# Patient Record
Sex: FEMALE | Race: WHITE | NOT HISPANIC OR LATINO | Employment: FULL TIME | URBAN - METROPOLITAN AREA
[De-identification: names, ages, dates, MRNs, and addresses within clinical notes are randomized per-mention and may not be internally consistent; named-entity substitution may affect disease eponyms.]

---

## 2019-04-03 ENCOUNTER — HOSPITAL ENCOUNTER (OUTPATIENT)
Dept: GENERAL RADIOLOGY | Facility: HOSPITAL | Age: 24
Discharge: HOME | End: 2019-04-03

## 2019-07-08 ENCOUNTER — TRANSCRIBE ORDERS (OUTPATIENT)
Dept: PHYSICAL THERAPY | Facility: CLINIC | Age: 24
End: 2019-07-08

## 2019-07-08 DIAGNOSIS — M25.512 LEFT SHOULDER PAIN, UNSPECIFIED CHRONICITY: Primary | ICD-10-CM

## 2019-07-08 DIAGNOSIS — M54.9 UPPER BACK PAIN: ICD-10-CM

## 2019-07-15 ENCOUNTER — TREATMENT (OUTPATIENT)
Dept: PHYSICAL THERAPY | Facility: CLINIC | Age: 24
End: 2019-07-15

## 2019-07-15 DIAGNOSIS — M25.512 ACUTE PAIN OF LEFT SHOULDER: Primary | ICD-10-CM

## 2019-07-15 PROCEDURE — 97161 PT EVAL LOW COMPLEX 20 MIN: CPT | Performed by: PHYSICAL THERAPIST

## 2019-07-15 PROCEDURE — 97110 THERAPEUTIC EXERCISES: CPT | Performed by: PHYSICAL THERAPIST

## 2019-07-15 NOTE — PROGRESS NOTES
Physical Therapy Initial Evaluation and Plan of Care        Subjective Evaluation    History of Present Illness  Date of onset: 7/2/2019  Mechanism of injury: Patient reports that she was moving tubs at work and had increasing pain in her left shoulder.  She describes the pain as pain in the scapula and numbness radiating down into her 5th digit on a constant basis. She states that she feels as though it originates in the back and under the scapula with some wrapping pain around the left chest wall.  Pain is aggravated with use.  Has some increase in pain with looking over her left shoulder.     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          Patient Occupation: [employer] -    Precautions and Work Restrictions: light duty - no lifting over 5# or pushing/pulling 10#Quality of life: good    Pain  Quality: throbbing, dull ache, radiating and sharp  Relieving factors: heat, medications and rest (mobic, zanaflex)  Aggravating factors: overhead activity, outstretched reach, sleeping, repetitive movement, lifting and movement    Social Support  Lives with: significant other    Hand dominance: right    Treatments  Previous treatment: medication  Patient Goals  Patient goals for therapy: return to work, return to sport/leisure activities, independence with ADLs/IADLs, decreased pain and increased motion             Objective       Static Posture     Shoulders  Elevated.    Postural Observations  Seated posture: fair  Standing posture: fair  Correction of posture: has no consistent effect        Palpation   Left   Hypertonic in the levator scapulae, middle trapezius and upper trapezius.   Tenderness of the anterior deltoid, biceps, levator scapulae, middle trapezius and upper trapezius.     Tenderness     Left Shoulder   Tenderness in the biceps tendon (proximal), lateral scapula, medial scapula and scapular spine.     Active Range of Motion   Left Shoulder   Normal active range of  motion  Flexion: with pain  Abduction: with pain  External rotation 90°: with pain    Scapular Mobility   Left Shoulder   Scapular mobility: fair  Scapular Mobility with Shoulder to 90° FF   Scapular winging: moderate    Tests     Left Shoulder   Positive painful arc.   Negative empty can.          Assessment & Plan     Assessment  Impairments: abnormal or restricted ROM, activity intolerance, lacks appropriate home exercise program and pain with function  Assessment details: Patient is a 22 y/o female who presents with increased left thoracic/scapula/shoulder pain.  She has increased tenderness to palpation along medial and superior scapular border as well as along costovertebral joints at T3-8 on left with minimal extension and left rotation. Radicular symptoms to 5th digit of left hand.   She has full AROM of left shoulder however painful at end ranges.  Mild decrease in left cervical rotation at end range.  Negative special testing for internal derangement of left shoulder.   Prognosis: good  Functional Limitations: carrying objects, lifting, uncomfortable because of pain, sitting and standing  Goals  Plan Goals: Long Term Goals (8 weeks)    Patient is able to return to work/community activities with minimal to no discomfort  Patient is able to lift 15 lbs from floor to waist  Patient is able to lift 10 lbs from waist to shoulder  Patient is able to lift 5 lbs from shoulder to overhead  Patient is able to work overhead as needed for work/community activities.       Short Term Goals (4 weeks)    Patient is independent with HEP  Patient is able to sleep without being disrupted by pain.   Patient has full painfree AROM/PROM of left shoulder and cervical spine  Patient has greater than 50% improvement overall.   Patient is able to perform bathing and grooming activities with use of involved UE extremity.   Patient has functional AROM  in order to look over shoulder to drive.   Patient has minimal to no tenderness to  palpation.                   Plan  Therapy options: will be seen for skilled physical therapy services  Planned modality interventions: TENS, ultrasound, thermotherapy (hydrocollator packs) and cryotherapy  Planned therapy interventions: manual therapy, soft tissue mobilization, strengthening, stretching, therapeutic activities, joint mobilization, home exercise program, functional ROM exercises, flexibility and body mechanics training  Frequency: 3x week  Duration in weeks: 8  Treatment plan discussed with: patient        Manual Therapy:    0     mins  98246;  Therapeutic Exercise:    15     mins  91429;     Neuromuscular Akiko:    0    mins  13746;    Therapeutic Activity:     0     mins  37626;     Gait Trainin     mins  17671;     Ultrasound:     8     mins  23893;    Work Hardening           0      mins 82103  Iontophoresis               0   mins 17731    Timed Treatment:   23   mins   Total Treatment:     50   mins    PT SIGNATURE: Karmen Nixon, PT   DATE TREATMENT INITIATED: 7/15/2019    Initial Certification  Certification Period: 10/13/2019  I certify that the therapy services are furnished while this patient is under my care.  The services outlined above are required by this patient, and will be reviewed every 90 days.     PHYSICIAN: Evelia Rasheed, RAFAEL      DATE:     Please sign and return via fax to [phone].. Thank you, [facility].

## 2019-07-17 ENCOUNTER — TREATMENT (OUTPATIENT)
Dept: PHYSICAL THERAPY | Facility: CLINIC | Age: 24
End: 2019-07-17

## 2019-07-17 DIAGNOSIS — M25.512 ACUTE PAIN OF LEFT SHOULDER: Primary | ICD-10-CM

## 2019-07-17 PROCEDURE — 97110 THERAPEUTIC EXERCISES: CPT | Performed by: PHYSICAL THERAPIST

## 2019-07-17 PROCEDURE — 97140 MANUAL THERAPY 1/> REGIONS: CPT | Performed by: PHYSICAL THERAPIST

## 2019-07-17 PROCEDURE — 97035 APP MDLTY 1+ULTRASOUND EA 15: CPT | Performed by: PHYSICAL THERAPIST

## 2019-07-17 PROCEDURE — 97014 ELECTRIC STIMULATION THERAPY: CPT | Performed by: PHYSICAL THERAPIST

## 2019-07-17 NOTE — PROGRESS NOTES
"Physical Therapy Daily Progress Note      Visit # 2      Subjective Evaluation    History of Present Illness    Subjective comment: \"I was sore following my treatment session\"  No change in symtpoms thus far.        Objective   See Exercise, Manual, and Modality Logs for complete treatment.       Assessment & Plan     Assessment  Assessment details: Patient tolerated treatment fairly well.  She has decreased muscle guarding of levator and upper trap however still has decreased thoracic mobility at upper thoracic segments with trigger point along medial scapular border.  Overall shoulder AROM has improved which was previously painful.  Subjective complaints remain unchanged.     Plan  Plan details: Progress as tolerated.                      Manual Therapy:    10     mins  78772;  Therapeutic Exercise:    20     mins  57018;     Neuromuscular Akiko:    0    mins  84681;    Therapeutic Activity:     0     mins  80152;     Gait Trainin     mins  89737;     Ultrasound:     8     mins  56635;    Work Hardening           0      mins 66875  Iontophoresis               0   mins 64487    Timed Treatment:   38   mins   Total Treatment:     53   mins    Karmen Nixon, PT  Physical Therapist  "

## 2019-07-18 ENCOUNTER — TREATMENT (OUTPATIENT)
Dept: PHYSICAL THERAPY | Facility: CLINIC | Age: 24
End: 2019-07-18

## 2019-07-18 DIAGNOSIS — M25.512 ACUTE PAIN OF LEFT SHOULDER: Primary | ICD-10-CM

## 2019-07-18 PROCEDURE — 97014 ELECTRIC STIMULATION THERAPY: CPT | Performed by: PHYSICAL THERAPIST

## 2019-07-18 PROCEDURE — 97035 APP MDLTY 1+ULTRASOUND EA 15: CPT | Performed by: PHYSICAL THERAPIST

## 2019-07-18 PROCEDURE — 97110 THERAPEUTIC EXERCISES: CPT | Performed by: PHYSICAL THERAPIST

## 2019-07-18 PROCEDURE — 97140 MANUAL THERAPY 1/> REGIONS: CPT | Performed by: PHYSICAL THERAPIST

## 2019-07-18 NOTE — PROGRESS NOTES
Re-Assessment / Re-Certification        Patient: Yajaira Lucas   : 1995  Diagnosis/ICD-10 Code:  No primary diagnosis found.  Referring practitioner: Evelia Rasheed,*  Date of Initial Evaluation:  Type: THERAPY  Noted: 7/15/2019  Patient seen for 3 sessions      Subjective:   Yajaira Lucas reports: Patient reports that things are getting better but still having pain and numbness into left UE.   Clinical Progress: improved  Home Program Compliance: Yes  Treatment has included: therapeutic exercise, manual therapy, electrical stimulation and ultrasound    Subjective Evaluation    History of Present Illness    Subjective comment: Patient reports that she is feeling a little better however continues to have intermittent numbness in left arm.      Objective       Static Posture     Shoulders  Elevated.    Postural Observations  Seated posture: fair  Standing posture: fair  Correction of posture: has no consistent effect        Palpation   Left   No palpable tenderness to the biceps.   Hypertonic in the levator scapulae, middle trapezius and upper trapezius.   Tenderness of the levator scapulae, middle trapezius and upper trapezius.     Tenderness     Left Shoulder   Tenderness in the medial scapula.     Active Range of Motion   Left Shoulder   Normal active range of motion  Flexion: with pain  Abduction: with pain  External rotation 90°: with pain    Scapular Mobility   Left Shoulder   Scapular mobility: fair  Scapular Mobility with Shoulder to 90° FF   Scapular winging: moderate    Tests     Left Shoulder   Negative empty can and painful arc.      Assessment & Plan     Assessment  Assessment details: Patient tolerated treatment fairly well.  Pain and tenderness have localized to the levator and medial scapula.  Improved cervical and left shoulder ROM to WNL.       Plan  Plan details: Progress as tolerated.       Progress toward previous goals: Partially Met      PT Signature: Karmen Nixon,  PT      Based upon review of the patient's progress and continued therapy plan, it is my medical opinion that Yajaira Lucas should continue physical therapy treatment at [facility]  [address] [place] [id number] [phone].    Signature: __________________________________  Evelia Rasheed APRN    Manual Therapy:    15     mins  81513;  Therapeutic Exercise:    20     mins  08415;     Neuromuscular Akiko:    0    mins  44708;    Therapeutic Activity:     0     mins  73876;     Gait Trainin     mins  62932;     Ultrasound:     8     mins  22798;    Work Hardening           0      mins 33346  Iontophoresis               0   mins 43584    Timed Treatment:   43   mins   Total Treatment:     58   mins

## 2019-07-24 ENCOUNTER — TREATMENT (OUTPATIENT)
Dept: PHYSICAL THERAPY | Facility: CLINIC | Age: 24
End: 2019-07-24

## 2019-07-24 DIAGNOSIS — M25.512 ACUTE PAIN OF LEFT SHOULDER: Primary | ICD-10-CM

## 2019-07-24 PROCEDURE — 97035 APP MDLTY 1+ULTRASOUND EA 15: CPT | Performed by: PHYSICAL THERAPIST

## 2019-07-24 PROCEDURE — 97110 THERAPEUTIC EXERCISES: CPT | Performed by: PHYSICAL THERAPIST

## 2019-07-24 PROCEDURE — 97140 MANUAL THERAPY 1/> REGIONS: CPT | Performed by: PHYSICAL THERAPIST

## 2019-07-24 NOTE — PROGRESS NOTES
Physical Therapy Daily Progress Note      Visit # 4      Subjective Evaluation    History of Present Illness    Subjective comment: Patient reports that she is having pain today in her left shoulder and neck.  She state that over the weekend it was better but after returning to work the past few days it is been hurting her.        Objective   See Exercise, Manual, and Modality Logs for complete treatment.       Assessment & Plan     Assessment  Assessment details: Patient has been tolerating treatment fairly well.  She has full Shoulder and cervical ROM.  Some joint restriction noted at C7-T1 with some mild scapular restriction on the left.     Plan  Plan details: Progress as tolerated.                      Manual Therapy:    10     mins  41778;  Therapeutic Exercise:    25     mins  88620;     Neuromuscular Akiko:    0    mins  83964;    Therapeutic Activity:     0     mins  32621;     Gait Trainin     mins  21685;     Ultrasound:     8     mins  65509;    Work Hardening           0      mins 84702  Iontophoresis               0   mins 33928    Timed Treatment:   43   mins   Total Treatment:     58   mins    Karmen Nixon, PT  Physical Therapist

## 2019-07-25 ENCOUNTER — TREATMENT (OUTPATIENT)
Dept: PHYSICAL THERAPY | Facility: CLINIC | Age: 24
End: 2019-07-25

## 2019-07-25 DIAGNOSIS — M25.512 ACUTE PAIN OF LEFT SHOULDER: Primary | ICD-10-CM

## 2019-07-25 PROCEDURE — 97140 MANUAL THERAPY 1/> REGIONS: CPT | Performed by: PHYSICAL THERAPIST

## 2019-07-25 PROCEDURE — 97035 APP MDLTY 1+ULTRASOUND EA 15: CPT | Performed by: PHYSICAL THERAPIST

## 2019-07-25 PROCEDURE — 97110 THERAPEUTIC EXERCISES: CPT | Performed by: PHYSICAL THERAPIST

## 2019-07-25 NOTE — PROGRESS NOTES
Physical Therapy Daily Progress Note      Visit # 5      Subjective Evaluation    History of Present Illness    Subjective comment: Patient reports that after she left therapy yesterday she had increased pain in her upper trap and radiating pain down into her left UE on a constant basis.  She states that pain lasted until 11:00.  today it has been a little better but still has intermittent radicular pain.     Patient indicates that she is still taking medication on a regular basis, however she can only take some of it at home due to driving.  Provider would like continued physical therapy until she sees the specialist.  Currently she reports Pain reports 8-9/10 pain due to neck pain and radicular symptoms.  Also comments that she is getting muscle spasms all day.     Objective   See Exercise, Manual, and Modality Logs for complete treatment.       Assessment & Plan     Assessment  Assessment details: Patient tolerated treatment fairly well.  She had no increase in radicular symptoms at the end of the treatment.  Continue to have some tightness noted through levator.  Improved segmental mobility at C7-T1.  Some noted tenderness to palpation along medial scapular border.     Plan  Plan details: Progress as tolerated.                      Manual Therapy:    10     mins  90351;  Therapeutic Exercise:    25     mins  30872;     Neuromuscular Akiko:    0    mins  18228;    Therapeutic Activity:     0     mins  58514;     Gait Trainin     mins  33874;     Ultrasound:     8     mins  18430;    Work Hardening           0      mins 35639  Iontophoresis               0   mins 46936    Timed Treatment:   43   mins   Total Treatment:     58   mins    Karmen Nixon, PT  Physical Therapist

## 2019-07-26 ENCOUNTER — TREATMENT (OUTPATIENT)
Dept: PHYSICAL THERAPY | Facility: CLINIC | Age: 24
End: 2019-07-26

## 2019-07-26 DIAGNOSIS — M25.512 ACUTE PAIN OF LEFT SHOULDER: Primary | ICD-10-CM

## 2019-07-26 PROCEDURE — 97110 THERAPEUTIC EXERCISES: CPT | Performed by: PHYSICAL THERAPIST

## 2019-07-26 PROCEDURE — 97014 ELECTRIC STIMULATION THERAPY: CPT | Performed by: PHYSICAL THERAPIST

## 2019-07-26 NOTE — PROGRESS NOTES
Physical Therapy Daily Progress Note          Subjective  Patient reports that the shoulder is tight and a little sore, currently rates the pain at 5-6/10.  Patient reports that she feels about the same since beginning PT.    Objective   See Exercise, Manual, and Modality Logs for complete treatment.       Assessment/Plan  Subjective reports remain about the same.  Added stretched for the UT, levator and pec muscles for posture and S/S in the left cervical region.  Patient tolerated the exercise progressions without pain or discomfort in the cervical spine or shoulder region.      Appointment with specialist next Thursday.               Manual Therapy:    0     mins  21213;  Therapeutic Exercise:    24     mins  07467;     Neuromuscular Akiko:    0    mins  44101;    Therapeutic Activity:     0     mins  33311;     Gait Trainin     mins  12894;     Ultrasound:     0     mins  99752;    Work Hardening           0      mins 15819  Iontophoresis               0   mins 08484    Timed Treatment:   24   mins   Total Treatment:     39   mins    Nikos Holcomb, PT  Physical Therapist

## 2019-08-01 ENCOUNTER — TREATMENT (OUTPATIENT)
Dept: PHYSICAL THERAPY | Facility: CLINIC | Age: 24
End: 2019-08-01

## 2019-08-01 DIAGNOSIS — M25.512 ACUTE PAIN OF LEFT SHOULDER: Primary | ICD-10-CM

## 2019-08-01 PROCEDURE — 97014 ELECTRIC STIMULATION THERAPY: CPT | Performed by: PHYSICAL THERAPIST

## 2019-08-01 PROCEDURE — 97110 THERAPEUTIC EXERCISES: CPT | Performed by: PHYSICAL THERAPIST

## 2019-08-01 PROCEDURE — 97035 APP MDLTY 1+ULTRASOUND EA 15: CPT | Performed by: PHYSICAL THERAPIST

## 2019-08-01 PROCEDURE — 97140 MANUAL THERAPY 1/> REGIONS: CPT | Performed by: PHYSICAL THERAPIST

## 2019-08-01 NOTE — PROGRESS NOTES
"Physical Therapy Daily Progress Note    Yajaira Lucas reports: \"I saw Dr. Gutierrez for the first time this morning.  She gave me an injection of lidocaine and some type of steroid.  It just feels kind of numb right now. It has also make me sleepy. She wants me to continue the therapy. I had one day, either Tuesday or Wednesday, where I had no pain but the next day it was 10 times worse.\"    Subjective     Objective   See Exercise, Manual, and Modality Logs for complete treatment.       Assessment & Plan     Assessment  Assessment details: Patient had initial consult with Dr. Syeda Gutierrez this date and received injection (L) upper trap.  She exhibits hypertonicity of (L) > (R) upper trap and cervicoscapular musculature along with moderate postural deficits, thereby increasing stress/load on cervical spine.  Discussed the role that hypertonicity of these muscles may play in creating patient's headaches.  She does report sleeper stretch makes her (L) UE go numb x 5-10 mins following performance, therefore it will be wise to D/C this stretch. She responds quite positively to modalities performed this date.        Progress per Plan of Care         Manual Therapy:    11     mins  07367;  Therapeutic Exercise:    21     mins  74999;     Neuromuscular Akiko:        mins  95229;    Therapeutic Activity:          mins  22601;     Gait Training:           mins  43645;     Ultrasound:     8     mins  49332;    Electrical Stimulation:    15     mins  00884 ( );  Dry Needling          mins self-pay    Timed Treatment:   40   mins   Total Treatment:     74   mins    Isadora Pierce PT, DPT  Physical Therapist  [place] License # [id number]    "

## 2019-08-02 ENCOUNTER — TREATMENT (OUTPATIENT)
Dept: PHYSICAL THERAPY | Facility: CLINIC | Age: 24
End: 2019-08-02

## 2019-08-02 DIAGNOSIS — M25.512 ACUTE PAIN OF LEFT SHOULDER: Primary | ICD-10-CM

## 2019-08-02 PROCEDURE — 97140 MANUAL THERAPY 1/> REGIONS: CPT | Performed by: PHYSICAL THERAPIST

## 2019-08-02 PROCEDURE — 97014 ELECTRIC STIMULATION THERAPY: CPT | Performed by: PHYSICAL THERAPIST

## 2019-08-02 PROCEDURE — 97035 APP MDLTY 1+ULTRASOUND EA 15: CPT | Performed by: PHYSICAL THERAPIST

## 2019-08-02 PROCEDURE — 97110 THERAPEUTIC EXERCISES: CPT | Performed by: PHYSICAL THERAPIST

## 2019-08-02 NOTE — PROGRESS NOTES
Physical Therapy Daily Progress Note      Visit # 8      Subjective Evaluation    History of Present Illness    Subjective comment: having some tenderness in left leevator, left UT and radiating up into the left cervical region. No headaches.Pain  Current pain ratin           Objective   See Exercise, Manual, and Modality Logs for complete treatment.       Assessment & Plan     Assessment  Assessment details: She continues to have hypertonicity of (L) > (R) upper trap, levator scapulae and cervicoscapular musculature as well as tightness in the suboccipital region.  Discussed importance of proper undergarments to support her breasts to relieve some the strain on her neck and upper back.                       Manual Therapy:    15      mins  13991;  Therapeutic Exercise:    21     mins  87217;     Neuromuscular Akiko:        mins  53582;    Therapeutic Activity:          mins  83721;     Gait Training:           mins  29963;     Ultrasound:     8     mins  06343;    Work Hardening                 mins 22235  Iontophoresis                  mins 66532    Timed Treatment: 44  mins   Total Treatment:     59   mins    Bella Macdonald, PT  Physical Therapist

## 2019-08-05 ENCOUNTER — TREATMENT (OUTPATIENT)
Dept: PHYSICAL THERAPY | Facility: CLINIC | Age: 24
End: 2019-08-05

## 2019-08-05 DIAGNOSIS — M25.512 ACUTE PAIN OF LEFT SHOULDER: Primary | ICD-10-CM

## 2019-08-05 PROCEDURE — 97035 APP MDLTY 1+ULTRASOUND EA 15: CPT | Performed by: PHYSICAL THERAPIST

## 2019-08-05 PROCEDURE — 97140 MANUAL THERAPY 1/> REGIONS: CPT | Performed by: PHYSICAL THERAPIST

## 2019-08-05 PROCEDURE — 97014 ELECTRIC STIMULATION THERAPY: CPT | Performed by: PHYSICAL THERAPIST

## 2019-08-05 PROCEDURE — 97110 THERAPEUTIC EXERCISES: CPT | Performed by: PHYSICAL THERAPIST

## 2019-08-05 NOTE — PROGRESS NOTES
Physical Therapy Daily Progress Note    Visit #9    Yajaira Lucas reports: If I'm not doing a lot, I don't have a lot of pain.  If I'm moving a lot or even doing paperwork at work where I'm using both arms, I start to have more pain.  I didn't get a lot of relief from the shot once the lidocaine wore off.  I don't notice any significant overall improvement.  I did use a hottub with the jets yesterday and that helped for about 2-3 hours afterward.    Subjective     Objective   See Exercise, Manual, and Modality Logs for complete treatment.       Assessment & Plan     Assessment  Assessment details: Patient reports minimal short term relief with PT interventions and modalities including ultrasound, electrical stimulation, and strengthening and stretching activities.  She exhibits persistent hypertonicity of (L) > (R) cervical and scapular musculature.  She benefits from tactile cueing for scapular adduction and depression activities.  She at this time will be transferring to a PT facility closer to her home and work.        Other - patient transferring to PT facility closer to her home/work.         Manual Therapy:    9     mins  95951;  Therapeutic Exercise:    22     mins  87209;     Neuromuscular Akiko:        mins  49342;    Therapeutic Activity:          mins  35617;     Gait Training:           mins  64453;     Ultrasound:     8     mins  66739;    Electrical Stimulation:    15     mins  92315 ( );  Dry Needling          mins self-pay    Timed Treatment:   39   mins   Total Treatment:     60   mins    Isadora Pierce PT, DPT  Physical Therapist  [place] License # [id number]

## 2019-11-15 ENCOUNTER — OFFICE VISIT CONVERTED (OUTPATIENT)
Dept: ORTHOPEDIC SURGERY | Facility: CLINIC | Age: 24
End: 2019-11-15
Attending: ORTHOPAEDIC SURGERY

## 2019-12-03 ENCOUNTER — HOSPITAL ENCOUNTER (OUTPATIENT)
Dept: OTHER | Facility: HOSPITAL | Age: 24
Discharge: HOME | End: 2019-12-03
Attending: ORTHOPAEDIC SURGERY

## 2019-12-06 ENCOUNTER — CONVERSION ENCOUNTER (OUTPATIENT)
Dept: ORTHOPEDIC SURGERY | Facility: CLINIC | Age: 24
End: 2019-12-06

## 2019-12-06 ENCOUNTER — OFFICE VISIT CONVERTED (OUTPATIENT)
Dept: ORTHOPEDIC SURGERY | Facility: CLINIC | Age: 24
End: 2019-12-06
Attending: ORTHOPAEDIC SURGERY

## 2019-12-31 ENCOUNTER — OFFICE VISIT CONVERTED (OUTPATIENT)
Dept: ORTHOPEDIC SURGERY | Facility: CLINIC | Age: 24
End: 2019-12-31
Attending: PHYSICIAN ASSISTANT

## 2020-01-24 ENCOUNTER — OFFICE VISIT CONVERTED (OUTPATIENT)
Dept: ORTHOPEDIC SURGERY | Facility: CLINIC | Age: 25
End: 2020-01-24
Attending: ORTHOPAEDIC SURGERY

## 2020-01-29 ENCOUNTER — DOCUMENTATION (OUTPATIENT)
Dept: PHYSICAL THERAPY | Facility: CLINIC | Age: 25
End: 2020-01-29

## 2020-05-08 ENCOUNTER — HOSPITAL ENCOUNTER (OUTPATIENT)
Dept: OTHER | Facility: HOSPITAL | Age: 25
Discharge: HOME | End: 2020-05-08
Attending: NURSE PRACTITIONER

## 2020-05-12 ENCOUNTER — HOSPITAL ENCOUNTER (OUTPATIENT)
Dept: OTHER | Facility: HOSPITAL | Age: 25
Discharge: HOME | End: 2020-05-12
Attending: ORTHOPAEDIC SURGERY

## 2020-05-14 ENCOUNTER — CONVERSION ENCOUNTER (OUTPATIENT)
Dept: ORTHOPEDIC SURGERY | Facility: CLINIC | Age: 25
End: 2020-05-14

## 2020-05-14 ENCOUNTER — TELEPHONE CONVERTED (OUTPATIENT)
Dept: ORTHOPEDIC SURGERY | Facility: CLINIC | Age: 25
End: 2020-05-14
Attending: ORTHOPAEDIC SURGERY

## 2020-11-04 ENCOUNTER — HOSPITAL ENCOUNTER (OUTPATIENT)
Dept: ULTRASOUND IMAGING | Facility: HOSPITAL | Age: 25
Discharge: HOME | End: 2020-11-04
Attending: OBSTETRICS & GYNECOLOGY

## 2021-04-16 ENCOUNTER — HOSPITAL ENCOUNTER (OUTPATIENT)
Dept: GENERAL RADIOLOGY | Facility: HOSPITAL | Age: 26
Discharge: HOME | End: 2021-04-16
Attending: INTERNAL MEDICINE

## 2021-05-13 NOTE — PROGRESS NOTES
"   Quick Note      Patient Name: Yajaira Lucas   Patient ID: [id number]   Sex: Female   YOB: 1995        Visit Date: May 14, 2020    Provider: Siddhartha Baker MD   Location: [facility]   Location Address: [address] [place] [address]  [id number]   Location Phone: [phone]          History Of Present Illness  TELEHEALTH TELEPHONE VISIT  Chief Complaint: Left shoulder pain   Yajaira Lucas is a 24 year old /White female who is presenting for evaluation via telehealth telephone visit. Verbal consent obtained before beginning visit.   Provider spent 5 minutes with the patient during telehealth visit.   The following staff were present during this visit: Siddhartha Baker MD   Past Medical History/Overview of Patient Symptoms     I spoke with Yajaira via telephone myself for approximately5 minutes. We discussed her recent MRI for cervical spine and her left shoulder. She requested telehealth due to her being pregnant. She is due in August. She says most of the pain is a burning pain in her arm that radiates down to the forearm. There is occasional numbness and tingling. She has been doing physical therapy.  She has been off of work for some time because of this. No new injury. MRI results of the shoulder and cervical spine were normal.     PLAN:  We recommended for her to obtain second opinion. Her request was to go to [facility] in [place]. We will set this up for her. She will follow-up with us as needed. Continue off of work until second opinion.          Vitals  Date Time BP Position Site L\R Cuff Size HR RR TEMP (F) WT  HT  BMI kg/m2 BSA m2 O2 Sat HC       05/14/2020 08:08 AM         124lbs 0oz 4'  10\" 25.92 1.52               Plan  · Medications  o Medications have been Reconciled  o Transition of Care or Provider Policy  · Instructions  o Plan Of Care:             Electronically Signed by: Meg Harrell, -Author on May 14, 2020 12:34:12 PM  Electronically " Co-signed by: Siddhartha Baker MD -Reviewer on May 14, 2020 09:41:35 PM

## 2021-05-15 VITALS
RESPIRATION RATE: 16 BRPM | OXYGEN SATURATION: 98 % | HEART RATE: 110 BPM | HEIGHT: 58 IN | BODY MASS INDEX: 21.62 KG/M2 | WEIGHT: 103 LBS

## 2021-05-15 VITALS — HEART RATE: 91 BPM | HEIGHT: 58 IN | OXYGEN SATURATION: 97 %

## 2021-05-15 VITALS — HEIGHT: 58 IN | HEART RATE: 99 BPM | OXYGEN SATURATION: 98 % | WEIGHT: 108 LBS | BODY MASS INDEX: 22.67 KG/M2

## 2021-05-15 VITALS — HEIGHT: 58 IN | BODY MASS INDEX: 22.67 KG/M2 | WEIGHT: 108 LBS | RESPIRATION RATE: 16 BRPM

## 2021-05-15 VITALS — HEIGHT: 58 IN | WEIGHT: 124 LBS | BODY MASS INDEX: 26.03 KG/M2

## 2022-02-10 ENCOUNTER — HOSPITAL ENCOUNTER (EMERGENCY)
Facility: HOSPITAL | Age: 27
Discharge: HOME | End: 2022-02-10
Attending: EMERGENCY MEDICINE | Admitting: EMERGENCY MEDICINE

## 2022-02-10 VITALS
TEMPERATURE: 97.8 F | SYSTOLIC BLOOD PRESSURE: 127 MMHG | HEART RATE: 83 BPM | DIASTOLIC BLOOD PRESSURE: 76 MMHG | WEIGHT: 124.34 LBS | BODY MASS INDEX: 25.07 KG/M2 | OXYGEN SATURATION: 98 % | RESPIRATION RATE: 18 BRPM | HEIGHT: 59 IN

## 2022-02-10 DIAGNOSIS — F33.9 RECURRENT MAJOR DEPRESSIVE DISORDER, REMISSION STATUS UNSPECIFIED: Primary | ICD-10-CM

## 2022-02-10 LAB
ALBUMIN SERPL-MCNC: 4.7 G/DL (ref 3.5–5.2)
ALBUMIN/GLOB SERPL: 1.8 G/DL
ALP SERPL-CCNC: 68 U/L (ref 39–117)
ALT SERPL W P-5'-P-CCNC: 8 U/L (ref 1–33)
AMPHET+METHAMPHET UR QL: NEGATIVE
ANION GAP SERPL CALCULATED.3IONS-SCNC: 11.8 MMOL/L (ref 5–15)
APAP SERPL-MCNC: <5 MCG/ML (ref 0–30)
AST SERPL-CCNC: 13 U/L (ref 1–32)
BARBITURATES UR QL SCN: NEGATIVE
BASOPHILS # BLD AUTO: 0.03 10*3/MM3 (ref 0–0.2)
BASOPHILS NFR BLD AUTO: 0.3 % (ref 0–1.5)
BENZODIAZ UR QL SCN: NEGATIVE
BILIRUB SERPL-MCNC: 0.8 MG/DL (ref 0–1.2)
BUN SERPL-MCNC: 9 MG/DL (ref 6–20)
BUN/CREAT SERPL: 13 (ref 7–25)
CALCIUM SPEC-SCNC: 9.5 MG/DL (ref 8.6–10.5)
CANNABINOIDS SERPL QL: NEGATIVE
CHLORIDE SERPL-SCNC: 103 MMOL/L (ref 98–107)
CO2 SERPL-SCNC: 23.2 MMOL/L (ref 22–29)
COCAINE UR QL: NEGATIVE
CREAT SERPL-MCNC: 0.69 MG/DL (ref 0.57–1)
DEPRECATED RDW RBC AUTO: 43.6 FL (ref 37–54)
EOSINOPHIL # BLD AUTO: 0.01 10*3/MM3 (ref 0–0.4)
EOSINOPHIL NFR BLD AUTO: 0.1 % (ref 0.3–6.2)
ERYTHROCYTE [DISTWIDTH] IN BLOOD BY AUTOMATED COUNT: 13.2 % (ref 12.3–15.4)
ETHANOL BLD-MCNC: <10 MG/DL (ref 0–10)
ETHANOL UR QL: <0.01 %
GFR SERPL CREATININE-BSD FRML MDRD: 101 ML/MIN/1.73
GLOBULIN UR ELPH-MCNC: 2.6 GM/DL
GLUCOSE SERPL-MCNC: 106 MG/DL (ref 65–99)
HCG SERPL QL: NEGATIVE
HCT VFR BLD AUTO: 42.1 % (ref 34–46.6)
HGB BLD-MCNC: 14.5 G/DL (ref 12–15.9)
HOLD SPECIMEN: NORMAL
IMM GRANULOCYTES # BLD AUTO: 0.04 10*3/MM3 (ref 0–0.05)
IMM GRANULOCYTES NFR BLD AUTO: 0.4 % (ref 0–0.5)
LYMPHOCYTES # BLD AUTO: 1.32 10*3/MM3 (ref 0.7–3.1)
LYMPHOCYTES NFR BLD AUTO: 13 % (ref 19.6–45.3)
MCH RBC QN AUTO: 31.1 PG (ref 26.6–33)
MCHC RBC AUTO-ENTMCNC: 34.4 G/DL (ref 31.5–35.7)
MCV RBC AUTO: 90.3 FL (ref 79–97)
METHADONE UR QL SCN: NEGATIVE
MONOCYTES # BLD AUTO: 0.4 10*3/MM3 (ref 0.1–0.9)
MONOCYTES NFR BLD AUTO: 3.9 % (ref 5–12)
NEUTROPHILS NFR BLD AUTO: 8.36 10*3/MM3 (ref 1.7–7)
NEUTROPHILS NFR BLD AUTO: 82.3 % (ref 42.7–76)
NRBC BLD AUTO-RTO: 0 /100 WBC (ref 0–0.2)
OPIATES UR QL: NEGATIVE
OXYCODONE UR QL SCN: NEGATIVE
PLATELET # BLD AUTO: 342 10*3/MM3 (ref 140–450)
PMV BLD AUTO: 11.1 FL (ref 6–12)
POTASSIUM SERPL-SCNC: 3.5 MMOL/L (ref 3.5–5.2)
PROT SERPL-MCNC: 7.3 G/DL (ref 6–8.5)
RBC # BLD AUTO: 4.66 10*6/MM3 (ref 3.77–5.28)
SALICYLATES SERPL-MCNC: <0.3 MG/DL
SARS-COV-2 RNA RESP QL NAA+PROBE: NOT DETECTED
SODIUM SERPL-SCNC: 138 MMOL/L (ref 136–145)
T4 FREE SERPL-MCNC: 1.28 NG/DL (ref 0.93–1.7)
TSH SERPL DL<=0.05 MIU/L-ACNC: 1.69 UIU/ML (ref 0.27–4.2)
WBC NRBC COR # BLD: 10.16 10*3/MM3 (ref 3.4–10.8)
WHOLE BLOOD HOLD SPECIMEN: NORMAL
WHOLE BLOOD HOLD SPECIMEN: NORMAL

## 2022-02-10 PROCEDURE — 36415 COLL VENOUS BLD VENIPUNCTURE: CPT | Performed by: EMERGENCY MEDICINE

## 2022-02-10 PROCEDURE — 82077 ASSAY SPEC XCP UR&BREATH IA: CPT | Performed by: EMERGENCY MEDICINE

## 2022-02-10 PROCEDURE — 80307 DRUG TEST PRSMV CHEM ANLYZR: CPT | Performed by: EMERGENCY MEDICINE

## 2022-02-10 PROCEDURE — 84439 ASSAY OF FREE THYROXINE: CPT | Performed by: EMERGENCY MEDICINE

## 2022-02-10 PROCEDURE — U0003 INFECTIOUS AGENT DETECTION BY NUCLEIC ACID (DNA OR RNA); SEVERE ACUTE RESPIRATORY SYNDROME CORONAVIRUS 2 (SARS-COV-2) (CORONAVIRUS DISEASE [COVID-19]), AMPLIFIED PROBE TECHNIQUE, MAKING USE OF HIGH THROUGHPUT TECHNOLOGIES AS DESCRIBED BY CMS-2020-01-R: HCPCS | Performed by: EMERGENCY MEDICINE

## 2022-02-10 PROCEDURE — 84443 ASSAY THYROID STIM HORMONE: CPT | Performed by: EMERGENCY MEDICINE

## 2022-02-10 PROCEDURE — 80179 DRUG ASSAY SALICYLATE: CPT | Performed by: EMERGENCY MEDICINE

## 2022-02-10 PROCEDURE — 99283 EMERGENCY DEPT VISIT LOW MDM: CPT

## 2022-02-10 PROCEDURE — C9803 HOPD COVID-19 SPEC COLLECT: HCPCS

## 2022-02-10 PROCEDURE — 85025 COMPLETE CBC W/AUTO DIFF WBC: CPT | Performed by: EMERGENCY MEDICINE

## 2022-02-10 PROCEDURE — 80053 COMPREHEN METABOLIC PANEL: CPT | Performed by: EMERGENCY MEDICINE

## 2022-02-10 PROCEDURE — 80143 DRUG ASSAY ACETAMINOPHEN: CPT | Performed by: EMERGENCY MEDICINE

## 2022-02-10 PROCEDURE — 84703 CHORIONIC GONADOTROPIN ASSAY: CPT | Performed by: EMERGENCY MEDICINE

## 2022-02-10 RX ORDER — SODIUM CHLORIDE 0.9 % (FLUSH) 0.9 %
10 SYRINGE (ML) INJECTION AS NEEDED
Status: DISCONTINUED | OUTPATIENT
Start: 2022-02-10 | End: 2022-02-10 | Stop reason: HOSPADM

## 2022-02-10 NOTE — ED PROVIDER NOTES
Time: 2:42 PM EST  Arrived by: private car  Chief Complaint: DEPRESSION   History provided by: pt  History is limited by: N/A     History of Present Illness:  Patient is a 26 y.o. female that presents to the emergency department with moderate  DEPRESSION. Pt states this has been ongoing since Tuesday and it has been constant. Nothing improves or worsens symptoms.     Pt states that she thought her significant other was cheating on her and had thoughts of SI. She states that she does not have any thoughts of SI currently. Pt denies pregnancy.     Pt is a current every day smoker. She denies drug or ETOH use. Pt goes to [facility].       History provided by:  Patient      Similar Symptoms Previously: yes  Recently seen: not recently seen in this ED       Patient Care Team  Primary Care Provider: Aron Hanna MD    Past Medical History:     Allergies   Allergen Reactions   • Latex Hives     Past Medical History:   Diagnosis Date   • Allergic    • Anxiety    • Depression      History reviewed. No pertinent surgical history.  History reviewed. No pertinent family history.    Home Medications:  Prior to Admission medications    Not on File        Social History:   Social History     Tobacco Use   • Smoking status: Never Smoker   • Smokeless tobacco: Not on file   Substance Use Topics   • Alcohol use: No   • Drug use: No     Recent travel: no     Review of Systems:  Review of Systems   Constitutional: Negative for chills, diaphoresis and fever.   HENT: Negative for ear discharge and nosebleeds.    Eyes: Negative for photophobia.   Respiratory: Negative for shortness of breath.    Cardiovascular: Negative for chest pain.   Gastrointestinal: Negative for diarrhea, nausea and vomiting.   Genitourinary: Negative for dysuria.   Musculoskeletal: Negative for back pain and neck pain.   Skin: Negative for rash.   Neurological: Negative for headaches.   Psychiatric/Behavioral: Negative for suicidal ideas.       "  Depression         Physical Exam:  /76   Pulse 83   Temp 97.8 °F (36.6 °C) (Oral)   Resp 18   Ht 149.9 cm (59\")   Wt 56.4 kg (124 lb 5.4 oz)   LMP 02/10/2022   SpO2 98%   BMI 25.11 kg/m²     Physical Exam  Vitals and nursing note reviewed.   Constitutional:       General: She is not in acute distress.     Appearance: Normal appearance.   HENT:      Head: Normocephalic and atraumatic.      Nose: Nose normal.   Eyes:      General: No scleral icterus.  Cardiovascular:      Rate and Rhythm: Normal rate and regular rhythm.      Heart sounds: Normal heart sounds.   Pulmonary:      Effort: Pulmonary effort is normal. No respiratory distress.      Breath sounds: Normal breath sounds.   Abdominal:      Palpations: Abdomen is soft.      Tenderness: There is no abdominal tenderness.   Musculoskeletal:         General: Normal range of motion.      Cervical back: Neck supple.      Right lower leg: No edema.      Left lower leg: No edema.   Skin:     General: Skin is warm and dry.   Neurological:      General: No focal deficit present.      Mental Status: She is alert and oriented to person, place, and time.      Sensory: No sensory deficit.      Motor: No weakness.   Psychiatric:         Mood and Affect: Mood is depressed. Affect is flat.         Thought Content: Thought content does not include suicidal ideation.                Medications in the Emergency Department:  Medications - No data to display     Labs  Lab Results (last 24 hours)     ** No results found for the last 24 hours. **           Imaging:  No Radiology Exams Resulted Within Past 24 Hours    Procedures:  Procedures    Progress  ED Course as of 02/12/22 0028   Thu Feb 10, 2022   1556 Pt was seen by Tamar , in the ED. She is not suicidal. She will be discharged with follow up to [facility].  [KJ]      ED Course User Index  [KJ] Meghan Rodgers                            Medical Decision Making:  MDM  Number of Diagnoses " or Management Options     Amount and/or Complexity of Data Reviewed  Clinical lab tests: reviewed  Decide to obtain previous medical records or to obtain history from someone other than the patient: yes  Obtain history from someone other than the patient: yes  Review and summarize past medical records: yes  Discuss the patient with other providers: yes  Independent visualization of images, tracings, or specimens: yes         Final diagnoses:   Recurrent major depressive disorder, remission status unspecified (HCC)        Disposition:  ED Disposition     ED Disposition Condition Comment    Discharge Stable           Documentation assistance provided by Meghan Rodgers acting as scribe for Maynor Johnson DO. Information recorded by the scribe was done at my direction and has been verified and validated by me.        Meghan Rodgers  02/10/22 1445       Maynor Johnson DO  02/12/22 0028

## 2022-02-10 NOTE — DISCHARGE INSTRUCTIONS
Take medications as directed.  Return for worsening symptoms.  Follow-up with [facility] as directed.

## 2022-02-10 NOTE — SIGNIFICANT NOTE
Pt presented to [facility] ED for psychiatric evaluation. She stated she came for help because she had suicidal thoughts with a plan to drink antifreeze. She reports she has been dealing with manic depressive episodes increasingly for the past week. They have been triggered by relationship concerns and fighting with her fiance. She was not comfortable speaking about why they were fighting, but reports since she got here they have been talking amicably and she feels better. She reports she has been expressing SI since Tuesday that has been transient. She no longer endorses SI and is requesting to leave.  Her goal is to get her medications adjusted. She sees a therapist and psychiatrist through [facility]. She will be speaking with her psychiatrist on 2/14 and her therapist on 2/21.  She lists her protective factors being her fiance and her 18 month old child. If she needs to call anyone during a crisis she would call her mother or her sister.   This SW recommends that pt be admitted to an inpatient psychiatric facility for safety, stabilization, and medication management.  She is not agreeable for admission.  Provider does not feel pt meets hold criteria.   Pt to be discharged home.

## 2022-02-12 ENCOUNTER — HOSPITAL ENCOUNTER (EMERGENCY)
Dept: HOSPITAL 49 - FER | Age: 27
Discharge: PSYCHIATRIC OR SUBSTANCE USE FACILITY | End: 2022-02-12
Payer: COMMERCIAL

## 2022-02-12 DIAGNOSIS — R45.851: Primary | ICD-10-CM

## 2022-02-12 DIAGNOSIS — E11.9: ICD-10-CM

## 2022-02-12 DIAGNOSIS — F17.210: ICD-10-CM

## 2022-02-12 DIAGNOSIS — Z20.822: ICD-10-CM

## 2022-02-12 DIAGNOSIS — Z91.040: ICD-10-CM

## 2022-02-12 LAB
ALBUMIN SERPL-MCNC: 4.2 G/DL (ref 3.4–5)
ALKALINE PHOSHATASE: 65 U/L (ref 46–116)
ALT SERPL-CCNC: 19 U/L (ref 14–59)
AMPHETAMINES: NEGATIVE
APAP SERPL-MCNC: < 2 UG/ML (ref 10–30)
AST: 15 U/L (ref 15–37)
BACTERIA: (no result)
BARBITURATES: NEGATIVE
BASOPHIL: 0.3 % (ref 0–2)
BILIRUBIN - TOTAL: 0.9 MG/DL (ref 0.2–1)
BILIRUBIN: (no result) MG/DL
BLOOD: NEGATIVE ERY/UL
BUN SERPL-MCNC: 10 MG/DL (ref 7–18)
BUN/CREAT RATIO (CALC): 12.5 RATIO
CHLORIDE: 103 MMOL/L (ref 98–107)
CLARITY UR: CLEAR
CO2 (BICARBONATE): 26 MMOL/L (ref 21–32)
COLOR: YELLOW
CREATININE: 0.8 MG/DL (ref 0.51–0.95)
ECSTASY (MDMA): NEGATIVE
EOSINOPHIL: 0.1 % (ref 0–5)
GLOBULIN (CALCULATION): 3.5 G/DL
GLUCOSE (U): NORMAL MG/DL
GLUCOSE SERPL-MCNC: 96 MG/DL (ref 74–106)
HCT: 43.5 % (ref 37–47)
HGB BLD-MCNC: 14.7 G/DL (ref 12.5–16)
LEUKOCYTES: NEGATIVE LEU/UL
LYMPHOCYTE: 9.2 % (ref 15–48)
MARIJUANA (THC): NEGATIVE
MCH RBC QN AUTO: 30.9 PG (ref 25–31)
MCHC RBC AUTO-ENTMCNC: 33.8 G/DL (ref 32–36)
MCV: 91.4 FL (ref 78–100)
METHADONE: NEGATIVE
MONOCYTE: 4.3 % (ref 0–12)
MPV: 10.8 FL (ref 6–9.5)
MUCOUS: (no result)
NEUTROPHIL: 85.6 % (ref 41–80)
NITRITE: NEGATIVE MG/DL
NRBC: 0
OPIATES: NEGATIVE
OXYCODONE: NEGATIVE
PLT: 337 K/UL (ref 150–400)
POTASSIUM: 3.9 MMOL/L (ref 3.5–5.1)
PROTEIN: (no result) MG/DL
RBC MORPHOLOGY: NORMAL
RBC: 4.76 M/UL (ref 4.2–5.4)
RDW: 13.2 % (ref 11.5–14)
SPECIFIC GRAVITY: 1.02 (ref 1–1.03)
SQUAMOUS EPITHELIAL CELLS: (no result)
TOTAL PROTEIN: 7.7 G/DL (ref 6.4–8.2)
URINARY RBC: (no result)
UROBILINOGEN: 4 MG/DL (ref 0.2–1)
WBC: 15.5 K/UL (ref 4–10.5)

## 2022-02-12 PROCEDURE — U0002 COVID-19 LAB TEST NON-CDC: HCPCS

## 2022-02-12 PROCEDURE — G0480 DRUG TEST DEF 1-7 CLASSES: HCPCS

## 2022-04-04 ENCOUNTER — HOSPITAL ENCOUNTER (EMERGENCY)
Dept: HOSPITAL 49 - FER | Age: 27
LOS: 1 days | Discharge: HOME | End: 2022-04-05
Payer: COMMERCIAL

## 2022-04-04 DIAGNOSIS — Z28.310: ICD-10-CM

## 2022-04-04 DIAGNOSIS — R11.0: ICD-10-CM

## 2022-04-04 DIAGNOSIS — Z91.040: ICD-10-CM

## 2022-04-04 DIAGNOSIS — F17.200: ICD-10-CM

## 2022-04-04 DIAGNOSIS — R10.30: Primary | ICD-10-CM

## 2022-04-04 LAB
ALBUMIN SERPL-MCNC: 3.8 G/DL (ref 3.4–5)
ALKALINE PHOSHATASE: 46 U/L (ref 46–116)
ALT SERPL-CCNC: 20 U/L (ref 14–59)
AST: 11 U/L (ref 15–37)
BASOPHIL: 0.7 % (ref 0–2)
BILIRUBIN - TOTAL: 1.1 MG/DL (ref 0.2–1)
BILIRUBIN: NEGATIVE MG/DL
BLOOD: NEGATIVE ERY/UL
BUN SERPL-MCNC: 6 MG/DL (ref 7–18)
BUN/CREAT RATIO (CALC): 9.1 RATIO
CHLORIDE: 104 MMOL/L (ref 98–107)
CLARITY UR: CLEAR
CO2 (BICARBONATE): 26 MMOL/L (ref 21–32)
COLOR: YELLOW
CREATININE: 0.66 MG/DL (ref 0.51–0.95)
EOSINOPHIL: 1.2 % (ref 0–5)
GLOBULIN (CALCULATION): 3 G/DL
GLUCOSE (U): NORMAL MG/DL
GLUCOSE SERPL-MCNC: 92 MG/DL (ref 74–106)
HCT: 42.2 % (ref 37–47)
HGB BLD-MCNC: 14.1 G/DL (ref 12.5–16)
LEUKOCYTES: NEGATIVE LEU/UL
LYMPHOCYTE: 28.4 % (ref 15–48)
MCH RBC QN AUTO: 31 PG (ref 25–31)
MCHC RBC AUTO-ENTMCNC: 33.4 G/DL (ref 32–36)
MCV: 92.7 FL (ref 78–100)
MONOCYTE: 7.3 % (ref 0–12)
MPV: 11.8 FL (ref 6–9.5)
NEUTROPHIL: 62.2 % (ref 41–80)
NITRITE: NEGATIVE MG/DL
NRBC: 0
PLT: 215 K/UL (ref 150–400)
POTASSIUM: 3.4 MMOL/L (ref 3.5–5.1)
PROTEIN: NEGATIVE MG/DL
RBC MORPHOLOGY: NORMAL
RBC: 4.55 M/UL (ref 4.2–5.4)
RDW: 13.7 % (ref 11.5–14)
SPECIFIC GRAVITY: 1.01 (ref 1–1.03)
TOTAL PROTEIN: 6.8 G/DL (ref 6.4–8.2)
UROBILINOGEN: 4 MG/DL (ref 0.2–1)
WBC: 6 K/UL (ref 4–10.5)

## 2022-04-25 ENCOUNTER — TELEPHONE (OUTPATIENT)
Dept: OBSTETRICS AND GYNECOLOGY | Facility: CLINIC | Age: 27
End: 2022-04-25

## 2022-04-25 ENCOUNTER — OFFICE VISIT (OUTPATIENT)
Dept: OBSTETRICS AND GYNECOLOGY | Facility: CLINIC | Age: 27
End: 2022-04-25

## 2022-04-25 VITALS
DIASTOLIC BLOOD PRESSURE: 72 MMHG | SYSTOLIC BLOOD PRESSURE: 106 MMHG | HEART RATE: 81 BPM | BODY MASS INDEX: 21.57 KG/M2 | WEIGHT: 107 LBS | HEIGHT: 59 IN

## 2022-04-25 DIAGNOSIS — R10.2 PELVIC PAIN: Primary | ICD-10-CM

## 2022-04-25 DIAGNOSIS — Z72.0 TOBACCO ABUSE: ICD-10-CM

## 2022-04-25 PROBLEM — F41.9 ANXIETY: Status: ACTIVE | Noted: 2020-01-24

## 2022-04-25 PROCEDURE — 99204 OFFICE O/P NEW MOD 45 MIN: CPT | Performed by: OBSTETRICS & GYNECOLOGY

## 2022-04-25 NOTE — PROGRESS NOTES
"GYN Visit    CC: Follow up     HPI:   26 y.o. who presents in follow-up from an emergency department visit.  She went to the emergency department due to pelvic pain.  He reports that her pain was severe.  During that evaluation she states that she had a CT and an ultrasound.  She reports that she was referred to our office due to an enlarged ovary.  She continues to have pain.  Is every day.  She denies any exacerbating or alleviating factors.  Tylenol and Motrin do not help.    History: PMHx, Meds, Allergies, PSHx, Social Hx, and POBHx all reviewed and updated.    /72   Pulse 81   Ht 149.9 cm (59\")   Wt 48.5 kg (107 lb)   BMI 21.61 kg/m²     Physical Exam  Vitals and nursing note reviewed. Exam conducted with a chaperone present.   Constitutional:       General: She is not in acute distress.     Appearance: Normal appearance. She is normal weight. She is not ill-appearing.   Abdominal:      General: Abdomen is flat. Bowel sounds are normal. There is no distension.      Palpations: Abdomen is soft. There is no mass.      Tenderness: There is no abdominal tenderness. There is no guarding or rebound.      Hernia: No hernia is present.   Genitourinary:     General: Normal vulva.      Exam position: Lithotomy position.      Pubic Area: No rash.       Labia:         Right: No rash, tenderness, lesion or injury.         Left: No rash, tenderness, lesion or injury.       Vagina: Normal.      Cervix: Normal.      Uterus: Normal. Not tender.       Adnexa:         Right: Tenderness present.         Left: Tenderness present.       Comments: There is bilateral adnexal tenderness with palpation.  This is mild.  There are no palpable adnexal masses.  Musculoskeletal:      Right lower leg: No edema.      Left lower leg: No edema.   Skin:     Findings: No lesion or rash.   Neurological:      Mental Status: She is alert and oriented to person, place, and time.   Psychiatric:         Mood and Affect: Mood normal.    "      Behavior: Behavior normal.         Thought Content: Thought content normal.         Judgment: Judgment normal.       ED notes from 4/4/2022 reviewed  ED pelvic ultrasound from 4/4/2022 reviewed.  No CT scan results were available for review.  Labs reviewed from 4/4/2022 including a CBC, CMP and urinalysis      ASSESSMENT AND PLAN:  Diagnoses and all orders for this visit:    1. Pelvic pain (Primary)  Assessment & Plan:  The etiology of the patient's pain is unclear.  Her ultrasound was normal.  The CT scan that she reports that was done was not available for review.  We can request a copy of this, although with a normal ultrasound I am not sure able provide further utility.  There is no localizing symptom on exam.  This may not be gynecologic in etiology.  Recommend Tylenol and/or ibuprofen over-the-counter for pain.  Repeat the pelvic ultrasound.  If it remains normal I would recommend further evaluation by her primary care physician.  If the patient continues to have pain with no obvious etiology then I will consider diagnostic laparoscopy to evaluate for etiology such as endometriosis.    Orders:  -     US Pelvis Transvaginal Non OB; Future    2. Tobacco abuse  Assessment & Plan:  Smoking cessation      Other orders  -     SCANNED - LABS  -     SCANNED - LABS      Follow Up:  Return in about 2 weeks (around 5/9/2022) for Recheck.    Heriberto Jaramillo MD  04/25/2022

## 2022-04-26 NOTE — ASSESSMENT & PLAN NOTE
The etiology of the patient's pain is unclear.  Her ultrasound was normal.  The CT scan that she reports that was done was not available for review.  We can request a copy of this, although with a normal ultrasound I am not sure able provide further utility.  There is no localizing symptom on exam.  This may not be gynecologic in etiology.  Recommend Tylenol and/or ibuprofen over-the-counter for pain.  Repeat the pelvic ultrasound.  If it remains normal I would recommend further evaluation by her primary care physician.  If the patient continues to have pain with no obvious etiology then I will consider diagnostic laparoscopy to evaluate for etiology such as endometriosis.

## 2022-09-19 ENCOUNTER — TELEPHONE (OUTPATIENT)
Dept: OBSTETRICS AND GYNECOLOGY | Facility: CLINIC | Age: 27
End: 2022-09-19

## 2022-09-19 NOTE — TELEPHONE ENCOUNTER
Pt sent my chart message that she needed an appointment for a ''follow up'' called patient and left a voicemail asking her to call the office if she still needs an appt.

## 2022-09-21 ENCOUNTER — OFFICE VISIT (OUTPATIENT)
Dept: OTOLARYNGOLOGY | Facility: CLINIC | Age: 27
End: 2022-09-21

## 2022-09-21 VITALS — WEIGHT: 103.4 LBS | HEIGHT: 59 IN | TEMPERATURE: 97.1 F | BODY MASS INDEX: 20.84 KG/M2

## 2022-09-21 DIAGNOSIS — R04.0 EPISTAXIS: ICD-10-CM

## 2022-09-21 DIAGNOSIS — J34.2 NASAL SEPTAL DEVIATION: ICD-10-CM

## 2022-09-21 DIAGNOSIS — H92.03 OTALGIA OF BOTH EARS: Primary | ICD-10-CM

## 2022-09-21 DIAGNOSIS — H93.13 TINNITUS OF BOTH EARS: ICD-10-CM

## 2022-09-21 DIAGNOSIS — J31.0 CHRONIC RHINITIS: ICD-10-CM

## 2022-09-21 DIAGNOSIS — M79.18 MYOFASCIAL MUSCLE PAIN: ICD-10-CM

## 2022-09-21 DIAGNOSIS — H91.93 BILATERAL HEARING LOSS, UNSPECIFIED HEARING LOSS TYPE: ICD-10-CM

## 2022-09-21 PROCEDURE — 99204 OFFICE O/P NEW MOD 45 MIN: CPT | Performed by: OTOLARYNGOLOGY

## 2022-09-21 RX ORDER — CYCLOBENZAPRINE HCL 10 MG
10 TABLET ORAL
Qty: 14 TABLET | Refills: 2 | Status: SHIPPED | OUTPATIENT
Start: 2022-09-21 | End: 2022-10-05

## 2022-09-21 RX ORDER — PANTOPRAZOLE SODIUM 40 MG/1
TABLET, DELAYED RELEASE ORAL
COMMUNITY
Start: 2022-09-12 | End: 2022-12-13

## 2022-09-21 RX ORDER — PREDNISONE 10 MG/1
TABLET ORAL
Qty: 42 TABLET | Refills: 0 | Status: SHIPPED | OUTPATIENT
Start: 2022-09-21 | End: 2022-12-13

## 2022-09-21 NOTE — PROGRESS NOTES
Patient Name: Yajaira Lucas   Visit Date: 2022   Patient ID: [id number]  Provider: Elmer Adair MD    Sex: female  Location: [facility]   YOB: 1995  Location Address: [address] [place] [address], [address] [place] [address],?[place]?[id number]    Primary Care Provider Aron Hanna MD  Location Phone: [phone]    Referring Provider: Aron Hanna MD        Chief Complaint  Nose Bleed and (R) ear pain    History of Present Illness  Yajaira Lucas is a 26 y.o. female who presents to [facility] today as a consult from Aron Hanna MD for evaluation of multiple complaints.  She tells me that over the past 4 weeks she has been experiencing intermittent sharp pains in her ears which often radiate to the neck.  She will often awaken with the pain in the morning.  She also mentions significant rhinorrhea, nasal congestion, and cough.  She has noticed a change in her hearing and does report tinnitus.  She has been on Augmentin and cefdinir without any improvement.  She denies any history of bruxism.  She also mentions a history of left-sided epistaxis which is intermittent lasting 3 to 4 minutes in duration and is not brisk.  She denies any prior nasal trauma or surgery.  She is not currently using any nasal sprays or rinses.      Past Medical History:   Diagnosis Date   • Allergic    • Anxiety    • Depression    • Nosebleed        Past Surgical History:   Procedure Laterality Date   •  SECTION     • EXPLORATORY LAPAROTOMY           Current Outpatient Medications:   •  pantoprazole (PROTONIX) 40 MG EC tablet, , Disp: , Rfl:   •  cyclobenzaprine (FLEXERIL) 10 MG tablet, Take 1 tablet by mouth every night at bedtime for 14 days., Disp: 14 tablet, Rfl: 2  •  predniSONE (DELTASONE) 10 MG tablet, 6 tabs daily x 2 days, 5 tabs daily x 2 days, 4 tabs x 2 days, 3 tablets x 2 days, 2 tablets x 2 days, and 1 tab x 2 days., Disp: 42  "tablet, Rfl: 0     Allergies   Allergen Reactions   • Latex Hives       Social History     Tobacco Use   • Smoking status: Current Every Day Smoker     Packs/day: 0.50     Years: 5.00     Pack years: 2.50     Types: Cigarettes   • Smokeless tobacco: Never Used   Vaping Use   • Vaping Use: Never used   Substance Use Topics   • Alcohol use: No   • Drug use: No        Objective     Vital Signs:   Temp 97.1 °F (36.2 °C) (Temporal)   Ht 149.9 cm (59\")   Wt 46.9 kg (103 lb 6.4 oz)   BMI 20.88 kg/m²       Physical Exam    General: Well developed, well nourished patient of stated age in no acute distress. Voice is strong and clear.   Head: Normocephalic and atraumatic.  Face: No lesions.  Bilateral parotid and submandibular glands are unremarkable.  Stensen's and Warthin's ducts are productive of clear saliva bilaterally.  House-Brackmann I/VI     bilaterally.   muscles and temporomandibular joint mild to moderately tender to palpation.  TMJ crepitus.  Eyes: PERRLA, sclerae anicteric, no conjunctival injection. Extraocular movements are intact and full. No nystagmus.   Ears: Auricles are normal in appearance. Bilateral external auditory canals are unremarkable. Bilateral tympanic membranes are clear and without effusion. Hearing normal to conversational voice.   Nose: External nose is normal in appearance. Bilateral nares are patent with mildly erythematous appearing mucosa. Septum deviated to the left. Turbinates are unremarkable. No lesions.   Oral Cavity: Lips are normal in appearance. Oral mucosa is unremarkable. Gingiva is unremarkable.  Partial, worn dentition for age. Tongue is unremarkable with good movement. Hard palate is unremarkable.   Oropharynx: Soft palate is unremarkable with full movement. Uvula is unremarkable. Bilateral tonsils are unremarkable. Posterior oropharynx is unremarkable.    Larynx and hypopharynx: Deferred secondary to gag reflex.  Neck: Supple.  No mass.  Nontender to palpation.  " Trachea midline. Thyroid normal size and without nodules to palpation.   Lymphatic: No lymphadenopathy upon palpation.  Respiratory: Clear to auscultation bilaterally, nonlabored respirations    Cardiovascular: RRR, no murmurs, rubs, or gallops,   Psychiatric: Appropriate affect, cooperative   Neurologic: Oriented x 3, strength symmetric in all extremities, Cranial Nerves II-XII are grossly intact to confrontation   Skin: Warm and dry. No rashes.    Procedures           Result Review :               Assessment and Plan    Diagnoses and all orders for this visit:    1. Otalgia of both ears (Primary)  -     Ambulatory Referral to Audiology    2. Chronic rhinitis    3. Myofascial muscle pain  -     cyclobenzaprine (FLEXERIL) 10 MG tablet; Take 1 tablet by mouth every night at bedtime for 14 days.  Dispense: 14 tablet; Refill: 2  -     predniSONE (DELTASONE) 10 MG tablet; 6 tabs daily x 2 days, 5 tabs daily x 2 days, 4 tabs x 2 days, 3 tablets x 2 days, 2 tablets x 2 days, and 1 tab x 2 days.  Dispense: 42 tablet; Refill: 0    4. Nasal septal deviation    5. Epistaxis    6. Tinnitus of both ears  -     Ambulatory Referral to Audiology    7. Bilateral hearing loss, unspecified hearing loss type    Impressions and findings were discussed at great length.  Currently, she is seen for evaluation of intermittent sharp otalgia which radiates to the neck, rhinorrhea, nasal congestion, epistaxis, tinnitus, and hearing loss.  Examination today reveals significant temporomandibular joint and  muscle tenderness to palpation.  There is no current evidence of ear infection but her nasal mucosa is mildly erythematous.  We discussed the differential for her symptoms as well as my concern that some of her symptoms may be explained by myofascial muscle pain/TMJ.  We discussed the pathophysiology and natural history of this condition.  Options for management were discussed and she will be tried on a course of prednisone and  cyclobenzaprine.  We will obtain an audiogram and tympanogram for further evaluation.  She will follow-up after her work-up but if she remains symptomatic we will likely pursue imaging.  She was given ample time to ask questions, all of which were answered to her satisfaction.    Follow Up   No follow-ups on file.  Patient was given instructions and counseling regarding her condition or for health maintenance advice. Please see specific information pulled into the AVS if appropriate.

## 2022-09-28 ENCOUNTER — HOSPITAL ENCOUNTER (OUTPATIENT)
Dept: HOSPITAL 49 - FAS | Age: 27
Discharge: HOME | End: 2022-09-28
Attending: SURGERY
Payer: COMMERCIAL

## 2022-09-28 VITALS — BODY MASS INDEX: 20.96 KG/M2 | HEIGHT: 59 IN | WEIGHT: 103.99 LBS

## 2022-09-28 DIAGNOSIS — R63.4: ICD-10-CM

## 2022-09-28 DIAGNOSIS — R11.0: ICD-10-CM

## 2022-09-28 DIAGNOSIS — R10.11: Primary | ICD-10-CM

## 2022-10-04 ENCOUNTER — TELEPHONE (OUTPATIENT)
Dept: OBSTETRICS AND GYNECOLOGY | Facility: CLINIC | Age: 27
End: 2022-10-04

## 2022-10-04 NOTE — TELEPHONE ENCOUNTER
Patient called stating she needed to reschedule her gyn ultarsound and fu with  to a Thursday or a Friday. I let her know that  is not in office on those days, patient stated those are the only days she is off work. I scheduled her for an ultrasound only and let her know that if  feels she would need to be seen back in office we can then let her know.

## 2022-10-04 NOTE — TELEPHONE ENCOUNTER
Caller: MICHELLE CHRIS    Relationship to patient: SELF    Best call back number: [phone]  OK TO CALL ANYTIME/LVM    Type of visit: U/S NO CHARGE EXAM-GYN FOLLOW UP    Requested date: PTS OFF DAYS ARE THUR/FRI    If rescheduling, when is the original appointment: 10/18/2022  9 AM/9:50AM    Additional notes:    PT NEEDS TO RESCHEDULE DUE TO WORK-HUB DOES NOT SCHEDULE U/S NO CHARGE EXAM    HUB ATTEMPTED WT

## 2022-12-13 ENCOUNTER — OFFICE VISIT (OUTPATIENT)
Dept: OBSTETRICS AND GYNECOLOGY | Facility: CLINIC | Age: 27
End: 2022-12-13

## 2022-12-13 VITALS
WEIGHT: 107 LBS | SYSTOLIC BLOOD PRESSURE: 116 MMHG | DIASTOLIC BLOOD PRESSURE: 87 MMHG | BODY MASS INDEX: 21.61 KG/M2 | HEART RATE: 96 BPM

## 2022-12-13 DIAGNOSIS — R10.2 PELVIC PAIN: Primary | ICD-10-CM

## 2022-12-13 DIAGNOSIS — Z72.0 TOBACCO ABUSE: ICD-10-CM

## 2022-12-13 PROBLEM — F31.81 BIPOLAR 2 DISORDER: Status: ACTIVE | Noted: 2022-01-01

## 2022-12-13 PROBLEM — M54.6 PAIN IN THORACIC SPINE: Status: ACTIVE | Noted: 2022-12-13

## 2022-12-13 PROBLEM — M51.16 RADICULOPATHY DUE TO LUMBAR INTERVERTEBRAL DISC DISORDER: Status: ACTIVE | Noted: 2022-11-15

## 2022-12-13 PROBLEM — M54.12 CERVICAL RADICULOPATHY: Status: ACTIVE | Noted: 2020-06-09

## 2022-12-13 PROBLEM — M54.9 CHRONIC BACK PAIN: Status: ACTIVE | Noted: 2021-02-14

## 2022-12-13 PROBLEM — G89.29 CHRONIC BACK PAIN: Status: ACTIVE | Noted: 2021-02-14

## 2022-12-13 PROBLEM — H91.93 BILATERAL HEARING LOSS: Status: ACTIVE | Noted: 2020-09-09

## 2022-12-13 PROCEDURE — 99213 OFFICE O/P EST LOW 20 MIN: CPT | Performed by: OBSTETRICS & GYNECOLOGY

## 2022-12-13 RX ORDER — SODIUM CHLORIDE, SODIUM LACTATE, POTASSIUM CHLORIDE, CALCIUM CHLORIDE 600; 310; 30; 20 MG/100ML; MG/100ML; MG/100ML; MG/100ML
125 INJECTION, SOLUTION INTRAVENOUS CONTINUOUS
Status: CANCELLED | OUTPATIENT
Start: 2022-12-13

## 2022-12-13 RX ORDER — HYDROXYZINE HYDROCHLORIDE 25 MG/1
25 TABLET, FILM COATED ORAL 2 TIMES DAILY PRN
COMMUNITY
Start: 2022-12-02

## 2022-12-13 RX ORDER — LURASIDONE HYDROCHLORIDE 20 MG/1
TABLET, FILM COATED ORAL
COMMUNITY
Start: 2022-11-18

## 2022-12-13 RX ORDER — ONDANSETRON 2 MG/ML
4 INJECTION INTRAMUSCULAR; INTRAVENOUS EVERY 6 HOURS PRN
Status: CANCELLED | OUTPATIENT
Start: 2022-12-13

## 2022-12-13 NOTE — PROGRESS NOTES
GYN Visit    CC: follow up pelvic pain    HPI:   26 y.o. who presents for evaluation of pelvic pain. The patient is having pelvic pain daily. Pain with intercourse. Menses is regular lasting 4-5 days. Pain is worse with menses.     History: PMHx, Meds, Allergies, PSHx, Social Hx, and POBHx all reviewed and updated.    /87   Pulse 96   Wt 48.5 kg (107 lb)   LMP 2022   BMI 21.61 kg/m²     Physical Exam  Vitals and nursing note reviewed. Exam conducted with a chaperone present.   Constitutional:       General: She is not in acute distress.     Appearance: Normal appearance. She is not ill-appearing.   HENT:      Head: Normocephalic and atraumatic.   Abdominal:      General: Abdomen is flat. There is no distension.      Palpations: Abdomen is soft. There is no mass.      Tenderness: There is abdominal tenderness. There is no guarding or rebound.      Hernia: No hernia is present.      Comments: There is some tenderness to palpation and in both lower quadrants.  No rebound tenderness or guarding.   Musculoskeletal:         General: No swelling.      Right lower leg: No edema.      Left lower leg: No edema.   Skin:     General: Skin is warm and dry.      Findings: No rash.   Neurological:      Mental Status: She is alert and oriented to person, place, and time.   Psychiatric:         Mood and Affect: Mood normal.         Behavior: Behavior normal.         Thought Content: Thought content normal.         Judgment: Judgment normal.           ASSESSMENT AND PLAN:  Diagnoses and all orders for this visit:    1. Pelvic pain (Primary)  Assessment & Plan:  The etiology of the patient's pain is still unclear at this time.  Her symptoms are refractory to OTC NSAIDs.  Her symptoms are exacerbated with intercourse and her menstrual cycle suggesting a gynecologic etiology.  Her symptoms are not necessarily cyclic however.  Birth control has not improved her symptoms in the past.  The patient is interested in  evaluation of endometriosis and diagnostic laparoscopy.  Her recent imaging studies do not show any definitive etiology of her symptoms.  At this time, I feel diagnostic laparoscopy is the next reasonable step.  The risks, benefits, and alternatives to the procedure have been reviewed the patient.  The risks included, but not limited to, infection, bleeding, hemorrhage, blood transfusion. Injury to nearby structures including: Bowel, bladder, pelvic blood vessels and nerves, ureters, and other nearby structures.  We discussed the risks of anesthesia.  The risks of postoperative complications, such as: Venous thromboembolism, myocardial infarction, stroke, and death.  The patient understands that this is primarily a diagnostic procedure.  While we will try to do all that is reasonably possible to improve the patient's symptoms, because the etiology of her pain is unknown at this time we the patient expressed her understanding of these risks, and alternatives to the procedure, and wishes to proceed.    I have recommended the patient continue OTC NSAIDs such as Tylenol and ibuprofen for her pelvic pain for now.    Orders:  -     Case Request; Standing  -     lactated ringers infusion  -     ondansetron (ZOFRAN) injection 4 mg  -     ceFAZolin (ANCEF) 2 g in sodium chloride 0.9 % 100 mL IVPB  -     Case Request    2. Tobacco abuse  Assessment & Plan:  Smoking cessation      Other orders  -     Follow Anesthesia Guidelines / Protocol; Future  -     Obtain Informed Consent; Future  -     Provide NPO Instructions to Patient; Future  -     Chlorhexidine Skin Prep; Future  -     Follow Anesthesia Guidelines / Protocol; Standing  -     Verify / Perform Chlorhexidine Skin Prep; Standing  -     Verify / Perform Chlorhexidine Skin Prep if Indicated (If Not Already Completed); Standing  -     Notify Physician - Standard; Standing  -     CBC & Differential; Standing  -     Type & Screen; Standing  -     hCG, Quantitative,  Pregnancy; Standing      Follow Up:  Return for After OR.    Heriberto Jaramillo MD  12/13/2022

## 2022-12-14 NOTE — ASSESSMENT & PLAN NOTE
The etiology of the patient's pain is still unclear at this time.  Her symptoms are refractory to OTC NSAIDs.  Her symptoms are exacerbated with intercourse and her menstrual cycle suggesting a gynecologic etiology.  Her symptoms are not necessarily cyclic however.  Birth control has not improved her symptoms in the past.  The patient is interested in evaluation of endometriosis and diagnostic laparoscopy.  Her recent imaging studies do not show any definitive etiology of her symptoms.  At this time, I feel diagnostic laparoscopy is the next reasonable step.  The risks, benefits, and alternatives to the procedure have been reviewed the patient.  The risks included, but not limited to, infection, bleeding, hemorrhage, blood transfusion. Injury to nearby structures including: Bowel, bladder, pelvic blood vessels and nerves, ureters, and other nearby structures.  We discussed the risks of anesthesia.  The risks of postoperative complications, such as: Venous thromboembolism, myocardial infarction, stroke, and death.  The patient understands that this is primarily a diagnostic procedure.  While we will try to do all that is reasonably possible to improve the patient's symptoms, because the etiology of her pain is unknown at this time we the patient expressed her understanding of these risks, and alternatives to the procedure, and wishes to proceed.    I have recommended the patient continue OTC NSAIDs such as Tylenol and ibuprofen for her pelvic pain for now.

## 2023-01-05 ENCOUNTER — TELEPHONE (OUTPATIENT)
Dept: OBSTETRICS AND GYNECOLOGY | Facility: CLINIC | Age: 28
End: 2023-01-05
Payer: COMMERCIAL

## 2023-01-05 NOTE — TELEPHONE ENCOUNTER
Left a message for the patient to discuss rescheduling her 1/6 annual with Dr. Ibrahim due to a family emergency.

## 2023-02-06 ENCOUNTER — TELEPHONE (OUTPATIENT)
Dept: OBSTETRICS AND GYNECOLOGY | Facility: CLINIC | Age: 28
End: 2023-02-06
Payer: COMMERCIAL

## 2023-02-06 NOTE — TELEPHONE ENCOUNTER
Spoke with patient / let her know that as long as she does not have a fever and her symptoms are improving she should be good for surgery on 2/9 / she also stated she will be having a follow up visit with her PCP tomorrow (2/7) I encouraged her to keep that appointment and the the PAT nurse should be calling her.

## 2023-02-06 NOTE — TELEPHONE ENCOUNTER
patient has OR scheduled for 2/9 / was seen in [place] [facility] on 2/3 for URI and Temp. 102.3 / tested neg. for Covid, flu, strep and RSV / has not had a fever since Friday / I have contacted PAT at [facility] and asked them to give her a call / She is asking if this will affect her surgery?

## 2023-02-07 ENCOUNTER — TELEPHONE (OUTPATIENT)
Dept: OBSTETRICS AND GYNECOLOGY | Facility: CLINIC | Age: 28
End: 2023-02-07
Payer: COMMERCIAL

## 2023-02-07 RX ORDER — ALBUTEROL SULFATE 90 UG/1
2 AEROSOL, METERED RESPIRATORY (INHALATION) EVERY 6 HOURS PRN
COMMUNITY
Start: 2023-02-01

## 2023-02-07 NOTE — PRE-PROCEDURE INSTRUCTIONS
PATIENT INSTRUCTED TO BE:    - NPO AFTER MIDNIGHT EXCEPT CAN HAVE CLEAR LIQUIDS 2 HOURS PRIOR TO SURGERY ARRIVAL TIME     - TO HOLD ALL VITAMINS, SUPPLEMENTS, NSAIDS FOR ONE WEEK PRIOR TO THEIR SURGICAL PROCEDURE    - INSTRUCTED PT TO USE SURGICAL SOAP 1 TIME THE NIGHT PRIOR TO SURGERY OR THE AM OF SURGERY.   USE SOAP FROM NECK TO TOES AVOID THEIR FACE, HAIR, AND PRIVATE PARTS. INSTRUCTED NO LOTIONS, JEWELRY, PIERCINGS, OR DEODORANT DAY OF SURGERY    - IF DIABETIC, CHECK BLOOD GLUCOSE IF LESS THAN 70 CALL PREOP AREA -AM OF SURGERY     - INSTRUCTED TO TAKE THE FOLLOWING MEDICATIONS THE DAY OF SURGERY:     INHALERS, LATUDA, HYDROXYZINE PRN     PATIENT VERBALIZED UNDERSTANDING

## 2023-02-08 PROBLEM — N94.10 DYSPAREUNIA IN FEMALE: Status: ACTIVE | Noted: 2023-02-08

## 2023-02-08 PROCEDURE — S0260 H&P FOR SURGERY: HCPCS | Performed by: OBSTETRICS & GYNECOLOGY

## 2023-02-08 NOTE — H&P
[facility]   HISTORY AND PHYSICAL    Patient Name: Yajaira Lucas  : 1995  MRN: [id number]  Primary Care Physician:  Ruslan Centeno APRN  Date of admission: 2023    Subjective   Subjective     Chief Complaint: Here for surgery    HPI:    Yajaira Lucas is a 27 y.o. female who presents for evaluation of chronic pelvic pain and dyspareunia.  The patient has had a long history of painful menses as well as pain with intercourse.  Her pain is also now occurring throughout the month although it is much worse with her menstrual cycle.  Menses is monthly lasting 4 to 5 days with moderate flow.  The patient is concerned about endometriosis and is interested in evaluation for endometriosis.  Medical therapy has not been successful at controlling symptoms.  NSAIDs minimally help her pain.    Review of Systems   All systems were reviewed and negative except for: Pelvic pain, painful menses, painful intercourse    Personal History     Past Medical History:   Diagnosis Date   • Allergic    • Anesthesia     SLOW TO WAKE UP POSTOP   • Anxiety    • Depression    • Nosebleed    • Pelvic pain        Past Surgical History:   Procedure Laterality Date   •  SECTION     • EXPLORATORY LAPAROTOMY     • GALLBLADDER SURGERY         Family History: family history includes Cancer in her father and maternal grandfather; Diabetes in her maternal uncle. Otherwise pertinent FHx was reviewed and not pertinent to current issue.    Social History:  reports that she has been smoking cigarettes. She has a 2.50 pack-year smoking history. She has never used smokeless tobacco. She reports that she does not drink alcohol and does not use drugs.    Home Medications:  Lurasidone HCl, albuterol sulfate HFA, and hydrOXYzine      Allergies:  Allergies   Allergen Reactions   • Latex Hives       Objective   Objective     Vitals:   Temp:  [98 °F (36.7 °C)] 98 °F (36.7 °C)  Heart Rate:  [76] 76  Resp:  [18] 18  BP: (106)/(74)  106/74  Physical Exam    Constitutional: Awake, alert   Eyes: PERRLA, sclerae anicteric, no conjunctival injection   HENT: NCAT, mucous membranes moist   Neck: Supple, no thyromegaly, no lymphadenopathy, trachea midline   Respiratory: Clear to auscultation bilaterally, nonlabored respirations    Cardiovascular: RRR, no murmurs, rubs, or gallops, palpable pedal pulses bilaterally   Gastrointestinal: Positive bowel sounds, soft, nontender, nondistended              Genitourinary: Normal external genitalia, vagina, and cervix.  The uterus is approximately 8 cm in size mobile and is mildly tender to palpation there are no palpable uterine or adnexal masses.  There is bilateral adnexal tenderness with palpation musculoskeletal: No bilateral ankle edema, no clubbing or cyanosis to extremities   Psychiatric: Appropriate affect, cooperative   Neurologic: Oriented x 3, strength symmetric in all extremities, speech clear   Skin: No rashes     Result Review    Result Review:  I have personally reviewed the results from the time of this admission to 2/9/2023 08:59 EST and agree with these findings:  [x]  Laboratory  []  Microbiology  [x]  Radiology  []  EKG/Telemetry   []  Cardiology/Vascular   [x]  Pathology  [x]  Old records  []  Other:      Assessment & Plan   Assessment / Plan   Active Hospital Problems:  Active Hospital Problems    Diagnosis    • **Pelvic pain    • Dyspareunia in female      Plan:   The patient has chronic pelvic pain and dyspareunia refractory to medical therapy.  She is concerned about endometriosis and is interested in proceeding with diagnostic laparoscopy for evaluation of causes of pelvic pain specifically endometriosis.  The risks, benefits, and alternatives to the procedure have been reviewed the patient.  The risks included, but not limited to, infection, bleeding, hemorrhage, blood transfusion. Injury to nearby structures including: Bowel, bladder, pelvic blood vessels and nerves, ureters, and  other nearby structures.  We discussed the risks of anesthesia.  The risks of postoperative complications, such as: Venous thromboembolism, myocardial infarction, stroke, and death.  The patient expressed her understanding of the risks, benefits, and alternatives to the procedure, and wishes to proceed.  She understands that the results laparoscopy may be normal and there might not be an obvious etiology of the patient's pelvic pain.  The patient understands that she may require further therapy including surgery and/or medical therapy to continue to treat her pelvic pain.    Electronically signed by Heriberto Jaramillo MD, 02/08/23, 3:07 PM EST.

## 2023-02-09 ENCOUNTER — ANESTHESIA (OUTPATIENT)
Dept: PERIOP | Facility: HOSPITAL | Age: 28
End: 2023-02-09
Payer: COMMERCIAL

## 2023-02-09 ENCOUNTER — HOSPITAL ENCOUNTER (OUTPATIENT)
Facility: HOSPITAL | Age: 28
Setting detail: HOSPITAL OUTPATIENT SURGERY
Discharge: HOME | End: 2023-02-09
Attending: OBSTETRICS & GYNECOLOGY | Admitting: OBSTETRICS & GYNECOLOGY
Payer: COMMERCIAL

## 2023-02-09 ENCOUNTER — ANESTHESIA EVENT (OUTPATIENT)
Dept: PERIOP | Facility: HOSPITAL | Age: 28
End: 2023-02-09
Payer: COMMERCIAL

## 2023-02-09 VITALS
BODY MASS INDEX: 21.24 KG/M2 | OXYGEN SATURATION: 98 % | TEMPERATURE: 98.1 F | WEIGHT: 105.38 LBS | RESPIRATION RATE: 16 BRPM | DIASTOLIC BLOOD PRESSURE: 57 MMHG | HEART RATE: 64 BPM | SYSTOLIC BLOOD PRESSURE: 88 MMHG | HEIGHT: 59 IN

## 2023-02-09 DIAGNOSIS — R10.2 PELVIC PAIN: ICD-10-CM

## 2023-02-09 DIAGNOSIS — Z98.890 S/P LAPAROSCOPY: Primary | ICD-10-CM

## 2023-02-09 LAB
ABO GROUP BLD: NORMAL
ABO GROUP BLD: NORMAL
BASOPHILS # BLD AUTO: 0.03 10*3/MM3 (ref 0–0.2)
BASOPHILS NFR BLD AUTO: 0.3 % (ref 0–1.5)
BLD GP AB SCN SERPL QL: NEGATIVE
DEPRECATED RDW RBC AUTO: 42.8 FL (ref 37–54)
EOSINOPHIL # BLD AUTO: 0.19 10*3/MM3 (ref 0–0.4)
EOSINOPHIL NFR BLD AUTO: 2 % (ref 0.3–6.2)
ERYTHROCYTE [DISTWIDTH] IN BLOOD BY AUTOMATED COUNT: 13.6 % (ref 12.3–15.4)
HCG INTACT+B SERPL-ACNC: <0.5 MIU/ML
HCT VFR BLD AUTO: 38.8 % (ref 34–46.6)
HGB BLD-MCNC: 13.2 G/DL (ref 12–15.9)
IMM GRANULOCYTES # BLD AUTO: 0.07 10*3/MM3 (ref 0–0.05)
IMM GRANULOCYTES NFR BLD AUTO: 0.7 % (ref 0–0.5)
LYMPHOCYTES # BLD AUTO: 2.6 10*3/MM3 (ref 0.7–3.1)
LYMPHOCYTES NFR BLD AUTO: 27.6 % (ref 19.6–45.3)
MCH RBC QN AUTO: 29.5 PG (ref 26.6–33)
MCHC RBC AUTO-ENTMCNC: 34 G/DL (ref 31.5–35.7)
MCV RBC AUTO: 86.8 FL (ref 79–97)
MONOCYTES # BLD AUTO: 0.52 10*3/MM3 (ref 0.1–0.9)
MONOCYTES NFR BLD AUTO: 5.5 % (ref 5–12)
NEUTROPHILS NFR BLD AUTO: 6.02 10*3/MM3 (ref 1.7–7)
NEUTROPHILS NFR BLD AUTO: 63.9 % (ref 42.7–76)
NRBC BLD AUTO-RTO: 0 /100 WBC (ref 0–0.2)
PLATELET # BLD AUTO: 384 10*3/MM3 (ref 140–450)
PMV BLD AUTO: 10.4 FL (ref 6–12)
RBC # BLD AUTO: 4.47 10*6/MM3 (ref 3.77–5.28)
RH BLD: POSITIVE
RH BLD: POSITIVE
T&S EXPIRATION DATE: NORMAL
WBC NRBC COR # BLD: 9.43 10*3/MM3 (ref 3.4–10.8)

## 2023-02-09 PROCEDURE — 86900 BLOOD TYPING SEROLOGIC ABO: CPT | Performed by: OBSTETRICS & GYNECOLOGY

## 2023-02-09 PROCEDURE — 25010000002 FENTANYL CITRATE (PF) 50 MCG/ML SOLUTION: Performed by: NURSE ANESTHETIST, CERTIFIED REGISTERED

## 2023-02-09 PROCEDURE — 25010000002 CEFAZOLIN IN DEXTROSE 2-4 GM/100ML-% SOLUTION: Performed by: OBSTETRICS & GYNECOLOGY

## 2023-02-09 PROCEDURE — 58673 LAPAROSCOPY SALPINGOSTOMY: CPT | Performed by: OBSTETRICS & GYNECOLOGY

## 2023-02-09 PROCEDURE — 86901 BLOOD TYPING SEROLOGIC RH(D): CPT | Performed by: OBSTETRICS & GYNECOLOGY

## 2023-02-09 PROCEDURE — 84702 CHORIONIC GONADOTROPIN TEST: CPT | Performed by: OBSTETRICS & GYNECOLOGY

## 2023-02-09 PROCEDURE — 25010000002 PROPOFOL 10 MG/ML EMULSION: Performed by: NURSE ANESTHETIST, CERTIFIED REGISTERED

## 2023-02-09 PROCEDURE — 25010000002 DEXAMETHASONE PER 1 MG: Performed by: NURSE ANESTHETIST, CERTIFIED REGISTERED

## 2023-02-09 PROCEDURE — 86850 RBC ANTIBODY SCREEN: CPT | Performed by: OBSTETRICS & GYNECOLOGY

## 2023-02-09 PROCEDURE — 86901 BLOOD TYPING SEROLOGIC RH(D): CPT

## 2023-02-09 PROCEDURE — 25010000002 ONDANSETRON PER 1 MG: Performed by: OBSTETRICS & GYNECOLOGY

## 2023-02-09 PROCEDURE — 86900 BLOOD TYPING SEROLOGIC ABO: CPT

## 2023-02-09 PROCEDURE — 25010000002 KETOROLAC TROMETHAMINE PER 15 MG: Performed by: NURSE ANESTHETIST, CERTIFIED REGISTERED

## 2023-02-09 PROCEDURE — 25010000002 MIDAZOLAM PER 1 MG: Performed by: ANESTHESIOLOGY

## 2023-02-09 PROCEDURE — 85025 COMPLETE CBC W/AUTO DIFF WBC: CPT | Performed by: OBSTETRICS & GYNECOLOGY

## 2023-02-09 RX ORDER — SODIUM CHLORIDE, SODIUM LACTATE, POTASSIUM CHLORIDE, CALCIUM CHLORIDE 600; 310; 30; 20 MG/100ML; MG/100ML; MG/100ML; MG/100ML
9 INJECTION, SOLUTION INTRAVENOUS CONTINUOUS PRN
Status: DISCONTINUED | OUTPATIENT
Start: 2023-02-09 | End: 2023-02-09 | Stop reason: HOSPADM

## 2023-02-09 RX ORDER — DEXAMETHASONE SODIUM PHOSPHATE 4 MG/ML
INJECTION, SOLUTION INTRA-ARTICULAR; INTRALESIONAL; INTRAMUSCULAR; INTRAVENOUS; SOFT TISSUE AS NEEDED
Status: DISCONTINUED | OUTPATIENT
Start: 2023-02-09 | End: 2023-02-09 | Stop reason: SURG

## 2023-02-09 RX ORDER — PROPOFOL 10 MG/ML
VIAL (ML) INTRAVENOUS AS NEEDED
Status: DISCONTINUED | OUTPATIENT
Start: 2023-02-09 | End: 2023-02-09

## 2023-02-09 RX ORDER — HYDROCODONE BITARTRATE AND ACETAMINOPHEN 5; 325 MG/1; MG/1
1-2 TABLET ORAL EVERY 4 HOURS PRN
Qty: 14 TABLET | Refills: 0 | Status: SHIPPED | OUTPATIENT
Start: 2023-02-09

## 2023-02-09 RX ORDER — BUPIVACAINE HYDROCHLORIDE AND EPINEPHRINE 5; 5 MG/ML; UG/ML
INJECTION, SOLUTION EPIDURAL; INTRACAUDAL; PERINEURAL AS NEEDED
Status: DISCONTINUED | OUTPATIENT
Start: 2023-02-09 | End: 2023-02-09 | Stop reason: HOSPADM

## 2023-02-09 RX ORDER — PROMETHAZINE HYDROCHLORIDE 12.5 MG/1
25 TABLET ORAL ONCE AS NEEDED
Status: DISCONTINUED | OUTPATIENT
Start: 2023-02-09 | End: 2023-02-09 | Stop reason: HOSPADM

## 2023-02-09 RX ORDER — PHENYLEPHRINE HCL IN 0.9% NACL 1 MG/10 ML
SYRINGE (ML) INTRAVENOUS AS NEEDED
Status: DISCONTINUED | OUTPATIENT
Start: 2023-02-09 | End: 2023-02-09 | Stop reason: SURG

## 2023-02-09 RX ORDER — MIDAZOLAM HYDROCHLORIDE 1 MG/ML
2 INJECTION INTRAMUSCULAR; INTRAVENOUS ONCE
Status: COMPLETED | OUTPATIENT
Start: 2023-02-09 | End: 2023-02-09

## 2023-02-09 RX ORDER — PROMETHAZINE HYDROCHLORIDE 25 MG/1
25 SUPPOSITORY RECTAL ONCE AS NEEDED
Status: DISCONTINUED | OUTPATIENT
Start: 2023-02-09 | End: 2023-02-09 | Stop reason: HOSPADM

## 2023-02-09 RX ORDER — OXYCODONE HYDROCHLORIDE 5 MG/1
5 TABLET ORAL
Status: DISCONTINUED | OUTPATIENT
Start: 2023-02-09 | End: 2023-02-09 | Stop reason: HOSPADM

## 2023-02-09 RX ORDER — SODIUM CHLORIDE, SODIUM LACTATE, POTASSIUM CHLORIDE, CALCIUM CHLORIDE 600; 310; 30; 20 MG/100ML; MG/100ML; MG/100ML; MG/100ML
125 INJECTION, SOLUTION INTRAVENOUS CONTINUOUS
Status: DISCONTINUED | OUTPATIENT
Start: 2023-02-09 | End: 2023-02-09 | Stop reason: HOSPADM

## 2023-02-09 RX ORDER — KETOROLAC TROMETHAMINE 30 MG/ML
INJECTION, SOLUTION INTRAMUSCULAR; INTRAVENOUS AS NEEDED
Status: DISCONTINUED | OUTPATIENT
Start: 2023-02-09 | End: 2023-02-09 | Stop reason: SURG

## 2023-02-09 RX ORDER — MEPERIDINE HYDROCHLORIDE 25 MG/ML
12.5 INJECTION INTRAMUSCULAR; INTRAVENOUS; SUBCUTANEOUS
Status: DISCONTINUED | OUTPATIENT
Start: 2023-02-09 | End: 2023-02-09 | Stop reason: HOSPADM

## 2023-02-09 RX ORDER — GLYCOPYRROLATE 0.2 MG/ML
INJECTION INTRAMUSCULAR; INTRAVENOUS AS NEEDED
Status: DISCONTINUED | OUTPATIENT
Start: 2023-02-09 | End: 2023-02-09 | Stop reason: SURG

## 2023-02-09 RX ORDER — ACETAMINOPHEN 500 MG
1000 TABLET ORAL ONCE
Status: COMPLETED | OUTPATIENT
Start: 2023-02-09 | End: 2023-02-09

## 2023-02-09 RX ORDER — MAGNESIUM HYDROXIDE 1200 MG/15ML
LIQUID ORAL AS NEEDED
Status: DISCONTINUED | OUTPATIENT
Start: 2023-02-09 | End: 2023-02-09 | Stop reason: HOSPADM

## 2023-02-09 RX ORDER — DEXMEDETOMIDINE HYDROCHLORIDE 100 UG/ML
INJECTION, SOLUTION INTRAVENOUS AS NEEDED
Status: DISCONTINUED | OUTPATIENT
Start: 2023-02-09 | End: 2023-02-09 | Stop reason: SURG

## 2023-02-09 RX ORDER — ONDANSETRON 2 MG/ML
4 INJECTION INTRAMUSCULAR; INTRAVENOUS EVERY 6 HOURS PRN
Status: DISCONTINUED | OUTPATIENT
Start: 2023-02-09 | End: 2023-02-09 | Stop reason: HOSPADM

## 2023-02-09 RX ORDER — PROPOFOL 10 MG/ML
VIAL (ML) INTRAVENOUS AS NEEDED
Status: DISCONTINUED | OUTPATIENT
Start: 2023-02-09 | End: 2023-02-09 | Stop reason: SURG

## 2023-02-09 RX ORDER — CEFAZOLIN SODIUM 2 G/100ML
2 INJECTION, SOLUTION INTRAVENOUS ONCE
Status: COMPLETED | OUTPATIENT
Start: 2023-02-09 | End: 2023-02-09

## 2023-02-09 RX ORDER — FENTANYL CITRATE 50 UG/ML
INJECTION, SOLUTION INTRAMUSCULAR; INTRAVENOUS AS NEEDED
Status: DISCONTINUED | OUTPATIENT
Start: 2023-02-09 | End: 2023-02-09 | Stop reason: SURG

## 2023-02-09 RX ORDER — LIDOCAINE HYDROCHLORIDE 20 MG/ML
INJECTION, SOLUTION EPIDURAL; INFILTRATION; INTRACAUDAL; PERINEURAL AS NEEDED
Status: DISCONTINUED | OUTPATIENT
Start: 2023-02-09 | End: 2023-02-09 | Stop reason: SURG

## 2023-02-09 RX ORDER — ROCURONIUM BROMIDE 10 MG/ML
INJECTION, SOLUTION INTRAVENOUS AS NEEDED
Status: DISCONTINUED | OUTPATIENT
Start: 2023-02-09 | End: 2023-02-09 | Stop reason: SURG

## 2023-02-09 RX ORDER — ONDANSETRON 2 MG/ML
4 INJECTION INTRAMUSCULAR; INTRAVENOUS ONCE AS NEEDED
Status: DISCONTINUED | OUTPATIENT
Start: 2023-02-09 | End: 2023-02-09 | Stop reason: HOSPADM

## 2023-02-09 RX ORDER — IBUPROFEN 600 MG/1
600 TABLET ORAL EVERY 6 HOURS PRN
Qty: 60 TABLET | Refills: 1 | Status: SHIPPED | OUTPATIENT
Start: 2023-02-09

## 2023-02-09 RX ADMIN — ROCURONIUM BROMIDE 50 MG: 10 INJECTION, SOLUTION INTRAVENOUS at 10:58

## 2023-02-09 RX ADMIN — MIDAZOLAM HYDROCHLORIDE 2 MG: 2 INJECTION, SOLUTION INTRAMUSCULAR; INTRAVENOUS at 10:38

## 2023-02-09 RX ADMIN — SODIUM CHLORIDE, POTASSIUM CHLORIDE, SODIUM LACTATE AND CALCIUM CHLORIDE: 600; 310; 30; 20 INJECTION, SOLUTION INTRAVENOUS at 10:57

## 2023-02-09 RX ADMIN — GLYCOPYRROLATE 0.2 MG: 0.2 INJECTION INTRAMUSCULAR; INTRAVENOUS at 11:48

## 2023-02-09 RX ADMIN — ONDANSETRON 4 MG: 2 INJECTION INTRAMUSCULAR; INTRAVENOUS at 10:58

## 2023-02-09 RX ADMIN — FENTANYL CITRATE 50 MCG: 50 INJECTION, SOLUTION INTRAMUSCULAR; INTRAVENOUS at 11:25

## 2023-02-09 RX ADMIN — ACETAMINOPHEN 1000 MG: 500 TABLET ORAL at 09:29

## 2023-02-09 RX ADMIN — Medication 100 MCG: at 11:19

## 2023-02-09 RX ADMIN — PROPOFOL 150 MG: 10 INJECTION, EMULSION INTRAVENOUS at 10:58

## 2023-02-09 RX ADMIN — CEFAZOLIN SODIUM 2 G: 2 INJECTION, SOLUTION INTRAVENOUS at 11:05

## 2023-02-09 RX ADMIN — DEXMEDETOMIDINE HYDROCHLORIDE 16 MCG: 100 INJECTION, SOLUTION, CONCENTRATE INTRAVENOUS at 11:25

## 2023-02-09 RX ADMIN — DEXAMETHASONE SODIUM PHOSPHATE 4 MG: 4 INJECTION, SOLUTION INTRA-ARTICULAR; INTRALESIONAL; INTRAMUSCULAR; INTRAVENOUS; SOFT TISSUE at 10:58

## 2023-02-09 RX ADMIN — FENTANYL CITRATE 50 MCG: 50 INJECTION, SOLUTION INTRAMUSCULAR; INTRAVENOUS at 11:23

## 2023-02-09 RX ADMIN — SODIUM CHLORIDE, POTASSIUM CHLORIDE, SODIUM LACTATE AND CALCIUM CHLORIDE 9 ML/HR: 600; 310; 30; 20 INJECTION, SOLUTION INTRAVENOUS at 09:26

## 2023-02-09 RX ADMIN — SUGAMMADEX 200 MG: 100 INJECTION, SOLUTION INTRAVENOUS at 11:41

## 2023-02-09 RX ADMIN — KETOROLAC TROMETHAMINE 30 MG: 30 INJECTION, SOLUTION INTRAMUSCULAR; INTRAVENOUS at 11:05

## 2023-02-09 RX ADMIN — LIDOCAINE HYDROCHLORIDE 100 MG: 20 INJECTION, SOLUTION EPIDURAL; INFILTRATION; INTRACAUDAL; PERINEURAL at 10:58

## 2023-02-09 NOTE — DISCHARGE INSTRUCTIONS
DISCHARGE INSTRUCTIONS  GYNECOLOGICAL  PROCEDURES      For your surgery you had:  General anesthesia (you may have a sore throat for the first 24 hours)  Local anesthesia  You received an anesthesia medication today that can cause hormonal forms of birth control to be ineffective. You should use a different form of birth control (to prevent pregnancy) for 7 days.   You may experience dizziness, drowsiness, or lightheadedness for several hours following surgery.  Do not stay alone today or tonight.  Limit your activity for 24 hours.  Resume your diet slowly.  Follow any special dietary instructions you may have been given by your doctor.  You should not drive or operate machinery, drink alcohol, or sign legally binding documents for 24 hours or while you are taking pain medication.    NOTIFY YOUR DOCTOR IF YOU EXPERIENCE ANY OF THE FOLLOWING:  Temperature greater than 101 degrees Fahrenheit  Shaking Chills  Redness or excessive drainage from incision  Nausea, vomiting and/or pain that is not controlled by prescribed medications  Increase in bleeding or bleeding that is excessive  Unable to urinate in 6 hours after surgery  If unable to reach your doctor, please go to the closest Emergency Room [x] Nothing in the vagina until cleared at follow up to include intercourse, douches, or tampons.  [x] Vaginal bleeding may be expected for several days with flow decreasing with time and never any heavier than a normal   period.  If you have foul smelling discharge, notify your physician.      SPECIAL INSTRUCTIONS:  -No driving while taking narcotic pain medications  -No lifting more than 15 to 20 pounds for 1 to 2 weeks  -No intercourse until follow-up  -Keep the incisions clean and dry  -Remove outer bandage 24 hours after surgery  -Remove inner bandage/Steri-Strips 1 week from surgery  -Call for temperature greater than 100 °F, shortness of breath or chest pain, heavy vaginal bleeding soaking a pad in less than 1 hour,  redness swelling or drainage from the incisions, excessive nausea or vomiting, or pain that is worsening despite current pain medications      Last dose of pain medication was given at:   Tylenol (1000mg) last at 9:30am. Do not exceed 4000mg of tylenol in a 24 hour period.  Toradol last at 11am. May take ibuprofen next at 5pm if needed.

## 2023-02-09 NOTE — ANESTHESIA PREPROCEDURE EVALUATION
Anesthesia Evaluation     Patient summary reviewed and Nursing notes reviewed   no history of anesthetic complications:  NPO Solid Status: > 8 hours  NPO Liquid Status: > 2 hours           Airway   Mallampati: II  TM distance: >3 FB  Neck ROM: full  No difficulty expected  Dental      Pulmonary - negative pulmonary ROS and normal exam    breath sounds clear to auscultation  Cardiovascular - negative cardio ROS and normal exam  Exercise tolerance: good (4-7 METS)    Rhythm: regular  Rate: normal        Neuro/Psych  (+) numbness, psychiatric history Depression,    GI/Hepatic/Renal/Endo - negative ROS     Musculoskeletal (-) negative ROS    Abdominal    Substance History - negative use     OB/GYN negative ob/gyn ROS         Other - negative ROS       ROS/Med Hx Other: PAT Nursing Notes unavailable.                   Anesthesia Plan    ASA 2     general     (Patient understands anesthesia not responsible for dental damage.)  intravenous induction     Anesthetic plan, risks, benefits, and alternatives have been provided, discussed and informed consent has been obtained with: patient.  Pre-procedure education provided  Plan discussed with CRNA.        CODE STATUS:

## 2023-02-09 NOTE — OP NOTE
"DIAGNOSTIC LAPAROSCOPY  Procedure Report    Patient Name:  Yajaira Lucas  YOB: 1995    Date of Surgery:  2/9/2023     Pre-op Diagnosis:   Pelvic pain [R10.2]       Post-Op Diagnosis Codes:     * Pelvic pain [R10.2]    Procedure(s):  DIAGNOSTIC LAPAROSCOPY    Staff:  Surgeon(s):  Heriberto Jaramillo MD    Assistant: Karmen Smith SRNA    Anesthesia: General    Estimated Blood Loss: 20 mL      Specimen:          None        Findings: The uterus was grossly normal size and shape.  Both ovaries appeared normal.  There is no evidence of any endometriosis or pelvic adhesions.  Both fallopian tubes had an unusual appearance.  The left fallopian tube was twisted in a way that it almost made a \"u-turn\" style term with the fimbriated edge pointing back up towards the round ligament.  There was a band of tissue holding the distal end of the tube in this orientation.  The right fallopian tube while not twisted in this manner, curled under the ovary and laid against the right pelvic sidewall.  Both the distal ends of the fallopian tubes appeared somewhat clubbed.  The appendix was normal.  The gallbladder was surgically absent.    Complications: None    Description of Procedure: After reviewing the informed consent including the risks benefits and alternatives to the procedure, the patient expressed her understanding and desire to proceed.  She was taken to the operating room with an IV in place and running.  She was placed on the operating table in the dorsal supine position.  The patient was given a general anesthetic, and intubated endotracheally.  The patient was then repositioned into the dorsal lithotomy position.  Her arms were tucked to the side, and her legs were placed in yellowfin stirrups.  We took care in positioning both the arms and legs, padding any potential pressure points.  The bladder was drained in a sterile fashion with an in and out catheterization. After a surgical time-out was " "performed, the patient was prepped and draped in the usual sterile fashion.  I inserted a Baez retractor into the posterior vagina, and a Cali retractor and the anterior vagina.  I used a single-tooth tenaculum grasped the anterior lip of the cervix, and a Hulka tenaculum was placed without difficulty.  The single-tooth tenaculum was removed.  The Baez and Benezett retractors were removed.  I then changed my gloves, and proceeded to the abdomen.    After ensuring the patient was flat, and had an orogastric tube in place, I infiltrated in the planned incision sites with a total of 10mL of half percent Marcaine with epinephrine.  I made a vertical infraumbilical skin incision with the scalpel.  With anterior traction on the anterior abdominal wall I inserted the Veress needle into the peritoneal cavity without difficulty.  Free-flowing saline through the Veress needle confirmed my intraperitoneal location.  Opening pressures were less than 7 mm of mercury.  The abdomen was insufflated to 25 mm of mercury. I then used a 5 mm Optiview trocar to enter the peritoneal cavity under direct visualization.      I then surveyed the area below the trocar insertion site, and there was no evidence of any bowel or vascular injury.  At this time I placed a 5 mm trocar in the left lower quadrant, under direct visualization.  The intra-abdominal pressure were reduced to 15 mm of mercury.  The patient was placed in Trendelenburg position, and the bowel was evacuated out of the pelvis with a blunt probe.    The uterus was grossly normal size and shape.  Both ovaries appeared normal.  There is no evidence of any endometriosis or pelvic adhesions.  Both fallopian tubes had an unusual appearance.  The left fallopian tube was twisted in a way that it almost made a \"u-turn\" style term with the fimbriated edge pointing back up towards the round ligament.  There was a band of tissue holding the distal end of the tube in this orientation.  The " "right fallopian tube while not twisted in this manner, curled under the ovary and laid against the right pelvic sidewall.  Both the distal ends of the fallopian tubes appeared somewhat clubbed.  The appendix was normal.  The gallbladder was surgically absent.    I decided to attempt to free the left fallopian tube to a more natural orientation.  I was concerned that there could be menstrual blood that was getting trapped inside the fallopian tube which could result in the patient's pelvic pain.  Given that the right tube was in an unusual position but appeared to be free I did not feel the need to surgically intervene on the positioning of the fallopian tube out of concern that this could affect the patient's fertility.  I called for the laparoscopic Metzenbaum scissors.  I could easily identify the band of tissue holding the fimbriated end of the tube and this \"U-turn\" orientation.  I divided this band of tissue.  There was minimal bleeding here.  I used 2 small short burst of cautery to control this on the serosal edge of the tube.  This allowed the tube to return to a very normal-appearing orientation.  The distal end did still appear to be somewhat clubbed.  I once again surveyed the pelvis.  I ensured representative photographs were taken of all of the structures.  I reinspected the fallopian tube which was hemostatic.    This completed the my procedure.  The left lower trocar was removed under direct visualization.  The camera was removed, and the pneumoperitoneum was evacuated from the patient's abdomen.  Once the pneumoperitoneum was evacuated, the trocar cannula was removed.  The skin sites were then reapproximated with simple, interrupted, subcuticular 4-0 Monocryl sutures.  The incisions were then reinforced with Mastisol and Steri-Strips, and covered with a coverlet.  The Hulka tenaculum was removed, and the tenaculum site was noted to be hemostatic.  The patient was taken out of lithotomy position, " awoken from her general anesthesia, and taken to the recovery room in satisfactory condition.  All counts were correct x2, and the patient received Kefzol as her preoperative antibiotics.  The patient will be discharged home which she meets PACU criteria.  The patient will follow up with me in approximately 2 weeks, and this appointment will be made for her prior to discharge.  For discharge medications please see the medication reconciliation sheet.  The patient was instructed to call the office for any of the following: Temperature greater than 100°F, shortness of breath or chest pain, pain that is worsening despite current pain medications, excessive nausea or vomiting, redness swelling or drainage from the incisions, heavy vaginal bleeding, or other concerns.    Assistant: Karmen Smith SRNA  was responsible for performing the following activities: Retraction, Placing Dressing and Held/Positioned Camera and their skilled assistance was necessary for the success of this case.    Heriberto Jaramillo MD     Date: 2/9/2023  Time: 11:54 EST

## 2023-02-09 NOTE — ANESTHESIA POSTPROCEDURE EVALUATION
Patient: Yajaira Lucas    Procedure Summary     Date: 02/09/23 Room / Location: [facility] OR 05 / [facility] MAIN OR    Anesthesia Start: 1057 Anesthesia Stop: 1158    Procedure: DIAGNOSTIC LAPAROSCOPY (Abdomen) Diagnosis:       Pelvic pain      (Pelvic pain [R10.2])    Surgeons: Heriberto Jaramillo MD Provider: Cam Machuca MD    Anesthesia Type: general ASA Status: 2          Anesthesia Type: general    Vitals  Vitals Value Taken Time   BP 85/52 02/09/23 1211   Temp 36.3 °C (97.4 °F) 02/09/23 1154   Pulse 56 02/09/23 1215   Resp 11 02/09/23 1205   SpO2 99 % 02/09/23 1215   Vitals shown include unvalidated device data.        Post Anesthesia Care and Evaluation    Patient location during evaluation: bedside  Patient participation: complete - patient participated  Level of consciousness: awake and alert  Pain management: adequate    Airway patency: patent  Anesthetic complications: No anesthetic complications  PONV Status: none  Cardiovascular status: acceptable  Respiratory status: acceptable  Hydration status: acceptable    Comments: An Anesthesiologist personally participated in the most demanding procedures (including induction and emergence if applicable) in the anesthesia plan, monitored the course of anesthesia administration at frequent intervals and remained physically present and available for immediate diagnosis and treatment of emergencies.

## 2023-02-21 ENCOUNTER — TELEPHONE (OUTPATIENT)
Dept: OBSTETRICS AND GYNECOLOGY | Facility: CLINIC | Age: 28
End: 2023-02-21
Payer: COMMERCIAL

## 2023-02-21 NOTE — TELEPHONE ENCOUNTER
Hub staff attempted to follow warm transfer process and was unsuccessful     Caller: Yajaira Lucas    Relationship to patient: Self    Best call back number: [phone]    Patient is needing: PT HAD SURGERY RECENTLY WITH DR. MONTANO AND SHE HAD SOME SYMPTOMS STARTED SAT, SEVERE PAIN IN STOMACH, FEVER , MIGRAINE, THROWING UP AND DIARRHEA, THEN LAST NIGHT SHE STARTED BLEEDING HEAVIER THAN WHAT A NORMAL PERIOD WOULD BE FOR HER.  PT HAS POST OP APPOINTMENT 12-27. PT WAS SEEN IN [facility] LAST NIGHT AND WAS ADVISED TO FOLLOW UP WITH PCP AND OBGYN.     PT CAN BE REACHED ANYTIME AND VM CAN BE LEFT IF SHE ISN'T

## 2023-02-21 NOTE — TELEPHONE ENCOUNTER
Patient was advised that the ER provider at [facility] felt she had a stomach bug due to the CT scan results. She was advised to follow up with her PCP. She was advised to keep her scheduled appointment with our office. She is due for her cycle at this time and was given bleeding precautions.

## 2023-02-27 ENCOUNTER — OFFICE VISIT (OUTPATIENT)
Dept: OBSTETRICS AND GYNECOLOGY | Facility: CLINIC | Age: 28
End: 2023-02-27
Payer: COMMERCIAL

## 2023-02-27 VITALS
BODY MASS INDEX: 22.06 KG/M2 | WEIGHT: 109.2 LBS | HEART RATE: 85 BPM | SYSTOLIC BLOOD PRESSURE: 118 MMHG | DIASTOLIC BLOOD PRESSURE: 84 MMHG | TEMPERATURE: 98.5 F

## 2023-02-27 DIAGNOSIS — N94.10 DYSPAREUNIA IN FEMALE: ICD-10-CM

## 2023-02-27 DIAGNOSIS — R10.2 PELVIC PAIN: ICD-10-CM

## 2023-02-27 DIAGNOSIS — Z48.89 POSTOPERATIVE VISIT: Primary | ICD-10-CM

## 2023-02-27 DIAGNOSIS — Z98.890 S/P LAPAROSCOPY: ICD-10-CM

## 2023-02-27 DIAGNOSIS — Z72.0 TOBACCO ABUSE: ICD-10-CM

## 2023-02-27 PROBLEM — F32.A DEPRESSIVE DISORDER: Status: ACTIVE | Noted: 2020-09-08

## 2023-02-27 PROCEDURE — 99024 POSTOP FOLLOW-UP VISIT: CPT | Performed by: OBSTETRICS & GYNECOLOGY

## 2023-02-27 NOTE — PROGRESS NOTES
Post Operative Visit      CC: Post operative follow up    HPI:   Pain: Still having mild pain around the incisions  Vaginal bleeding: Yes, menstrual flow  Vaginal discharge:  No  Fever/chills:  No  Good appetite:  Yes  Normal bladder function:  Yes  Normal bowel function:  Yes    Operative report, surgical findings and any pathology reviewed.    /84   Pulse 85   Temp 98.5 °F (36.9 °C)   Wt 49.5 kg (109 lb 3.2 oz)   LMP 02/18/2023   Breastfeeding No   BMI 22.06 kg/m²     Physical Exam  Vitals and nursing note reviewed. Exam conducted with a chaperone present.   Constitutional:       General: She is not in acute distress.     Appearance: Normal appearance. She is not ill-appearing.   Abdominal:      General: There is no distension.      Palpations: Abdomen is soft. There is no mass.      Tenderness: There is no abdominal tenderness. There is no guarding or rebound.      Hernia: No hernia is present.      Comments: The abdomen is nontender.  The incisions are clean, dry, intact and well-healed and without signs of infection.   Musculoskeletal:      Right lower leg: No edema.      Left lower leg: No edema.   Skin:     General: Skin is warm and dry.      Findings: No rash.   Neurological:      Mental Status: She is alert and oriented to person, place, and time.   Psychiatric:         Mood and Affect: Mood normal.         Behavior: Behavior normal.         Thought Content: Thought content normal.         Judgment: Judgment normal.           ASSESSMENT:  Post-operative exam    Diagnoses and all orders for this visit:    1. Postoperative visit (Primary)  Assessment & Plan:  Stable postop course  Continue OTC ibuprofen as needed for any further postoperative or pelvic pain.  Resume all normal activities      2. S/P laparoscopy    3. Dyspareunia in female    4. Pelvic pain  Assessment & Plan:  Continue to monitor symptoms.  No obvious etiology noted through her gynecologic exam and diagnostic laparoscopy.  If her  symptoms continue I recommend evaluation by her primary care physician.      5. Tobacco abuse  Assessment & Plan:  Smoking cessation          PLAN:    Any available photos and/or pathology were reviewed.  All questions answered.     Ok to resume normal activities  Ok to resume intercourse  Return to school/work without limitations      Follow Up:  Return for Annual physical.      Heriberto Jaramillo MD  02/27/2023

## 2023-02-28 PROBLEM — Z48.89 POSTOPERATIVE VISIT: Status: ACTIVE | Noted: 2023-02-28

## 2023-02-28 NOTE — ASSESSMENT & PLAN NOTE
Continue to monitor symptoms.  No obvious etiology noted through her gynecologic exam and diagnostic laparoscopy.  If her symptoms continue I recommend evaluation by her primary care physician.

## 2023-02-28 NOTE — ASSESSMENT & PLAN NOTE
Stable postop course  Continue OTC ibuprofen as needed for any further postoperative or pelvic pain.  Resume all normal activities

## 2023-08-24 NOTE — PROGRESS NOTES
GYN Problem/Follow Up Visit    Chief Complaint   Patient presents with    Follow-up     AUB         HPI  Yajaira Lucas is a 27 y.o. female, , who presents for evaluation of bleeding changes,    After having exploratory laparoscopy did not have menses 2 months, normal menses may, , Most recent menses started 8-9-2, still bleeding, light flow, change regular tampon every 2 hours. No menstrual cramps.  Laparoscopy , no endometriosis or pelvic adhesions noted    No new partners, 30 pounds weight loss last year during depression treatment, gained that back over this past year.     Last week experienced a stomach virus    Experiencing suprapubic pain since Wednesday, only when voiding, experiencing urinary urgency    Sexually active, not using contraception  Excessive facial hair growth of the upper lip and chin    SCANNED - PAP SMEAR (2019)   US non-ob transvaginal (2023 01:51)        Aditional OB/GYN History   Patient's last menstrual period was 2023 (approximate).  Current contraception: contraceptive methods: None    Past Medical History:   Diagnosis Date    Allergic     Anesthesia     SLOW TO WAKE UP POSTOP    Anxiety     Depression     Nosebleed     Pelvic pain       Past Surgical History:   Procedure Laterality Date     SECTION      DIAGNOSTIC LAPAROSCOPY N/A 2023    Procedure: DIAGNOSTIC LAPAROSCOPY;  Surgeon: Heriberto Jaramillo MD;  Location: [facility] OR;  Service: Gynecology;  Laterality: N/A;    EXPLORATORY LAPAROTOMY      GALLBLADDER SURGERY        Family History   Problem Relation Age of Onset    Cancer Father         ORAL    Cancer Maternal Grandfather         LUNG    Diabetes Maternal Uncle     Breast cancer Neg Hx     Ovarian cancer Neg Hx     Uterine cancer Neg Hx     Prostate cancer Neg Hx     Colon cancer Neg Hx      Allergies as of 2023 - Reviewed 2023   Allergen Reaction Noted    Latex Hives 02/10/2022      The additional  "following portions of the patient's history were reviewed and updated as appropriate: allergies, current medications, past family history, past medical history, past social history, past surgical history, and problem list.    Review of Systems    See HPI for pertinent ROS    Objective   /84   Pulse 89   Ht 149.9 cm (59\")   Wt 54 kg (119 lb)   LMP 08/09/2023 (Approximate)   BMI 24.04 kg/mý     Physical Exam  Vitals and nursing note reviewed. Exam conducted with a chaperone present.   Constitutional:       Appearance: Normal appearance.   Cardiovascular:      Rate and Rhythm: Normal rate.   Pulmonary:      Effort: Pulmonary effort is normal.   Abdominal:      Tenderness: There is no right CVA tenderness or left CVA tenderness.   Genitourinary:     General: Normal vulva.      Vagina: Normal. Bleeding (scant menstrual blood in vault) present.      Cervix: Normal. Cervical bleeding (light menstrual) present.      Uterus: Normal. Tender (mildly).       Adnexa: Right adnexa normal and left adnexa normal.   Lymphadenopathy:      Lower Body: No right inguinal adenopathy. No left inguinal adenopathy.   Skin:     General: Skin is warm and dry.   Neurological:      Mental Status: She is alert and oriented to person, place, and time.        Assessment and Plan    Diagnoses and all orders for this visit:    1. Pelvic pain (Primary)  -     POC Pregnancy, Urine  -     POC Urinalysis Dipstick  -     Urine Culture - Urine, Urine, Clean Catch  -     Urinalysis With Microscopic - Urine, Clean Catch  -     Chlamydia trachomatis, Neisseria gonorrhoeae, PCR - Swab, Cervix  -     Gardnerella vaginalis, Trichomonas vaginalis, Candida albicans, DNA - Swab, Vagina    2. Menorrhagia with irregular cycle  -     Chlamydia trachomatis, Neisseria gonorrhoeae, PCR - Swab, Cervix  -     Gardnerella vaginalis, Trichomonas vaginalis, Candida albicans, DNA - Swab, Vagina  -     Prolactin  -     TSH  -     CBC (No Diff)  -     DHEA-Sulfate  - "     Insulin, Total  -     Glucose, Fasting  -     Testosterone  -     17-Hydroxyprogesterone    3. Encounter for initial prescription of contraceptive pills  -     norgestrel-ethinyl estradiol (LOW-OGESTREL,CRYSELLE) 0.3-30 MG-MCG per tablet; Take 1 tablet by mouth Daily.  Dispense: 90 tablet; Refill: 3      HCG, Urine, QL   Date Value Ref Range Status   08/25/2023 Negative Negative Final       Specific Gravity    Date Value Ref Range Status   08/25/2023 1.020 1.005 - 1.030 Final     pH, Urine   Date Value Ref Range Status   08/25/2023 5.0 5.0 - 8.0 Final     Leukocytes   Date Value Ref Range Status   08/25/2023 75 Lakeisha/ul (A) Negative Final     Nitrite, UA   Date Value Ref Range Status   08/25/2023 Negative Negative Final     Protein, POC   Date Value Ref Range Status   08/25/2023 100 mg/dL (A) Negative mg/dL Final     Glucose, UA   Date Value Ref Range Status   08/25/2023 Negative Negative mg/dL Final     Ketones, UA   Date Value Ref Range Status   08/25/2023 Negative Negative Final     Urobilinogen, UA   Date Value Ref Range Status   08/25/2023 1 E.U./dL (A) Normal, 0.2 E.U./dL Final     Bilirubin   Date Value Ref Range Status   08/25/2023 1 mg/dL (A) Negative Final     Blood, UA   Date Value Ref Range Status   08/25/2023 2+ (A) Menstruating Negative Final     Lot Number   Date Value Ref Range Status   08/25/2023 [id number]  Final     Expiration Date   Date Value Ref Range Status   08/25/2023 10/06/2024  Final         Counseling:  TRACK MENSES, RTO if <q21d, >7d long, heavy or painful.    All BIRTH CONTROL options R/B/A/SE/E of each reviewed in detail.  OCP/hormone use risk THROMBOEMBOLIC RISK reviewed.     SAFE SEX/condoms importance reviewed.       She understands the importance of having any ordered tests to be performed in a timely fashion.  The risks of not performing them include, but are not limited to, advanced cancer stages, bone loss from osteoporosis and/or subsequent increase in morbidity and/or  mortality.  She is encouraged to review her results online and/or contact or office if she has questions.     Follow Up:  Return in about 3 months (around 11/25/2023).        Monica Hills, APRN  08/25/2023

## 2023-08-25 ENCOUNTER — OFFICE VISIT (OUTPATIENT)
Dept: OBSTETRICS AND GYNECOLOGY | Facility: CLINIC | Age: 28
End: 2023-08-25
Payer: COMMERCIAL

## 2023-08-25 VITALS
DIASTOLIC BLOOD PRESSURE: 84 MMHG | WEIGHT: 119 LBS | HEART RATE: 89 BPM | HEIGHT: 59 IN | BODY MASS INDEX: 23.99 KG/M2 | SYSTOLIC BLOOD PRESSURE: 130 MMHG

## 2023-08-25 DIAGNOSIS — N92.1 MENORRHAGIA WITH IRREGULAR CYCLE: ICD-10-CM

## 2023-08-25 DIAGNOSIS — Z30.011 ENCOUNTER FOR INITIAL PRESCRIPTION OF CONTRACEPTIVE PILLS: ICD-10-CM

## 2023-08-25 DIAGNOSIS — R10.2 PELVIC PAIN: Primary | ICD-10-CM

## 2023-08-25 LAB
B-HCG UR QL: NEGATIVE
BILIRUB BLD-MCNC: ABNORMAL MG/DL
C TRACH RRNA CVX QL NAA+PROBE: NOT DETECTED
CANDIDA SPECIES: NEGATIVE
DEPRECATED RDW RBC AUTO: 43.8 FL (ref 37–54)
ERYTHROCYTE [DISTWIDTH] IN BLOOD BY AUTOMATED COUNT: 13 % (ref 12.3–15.4)
EXPIRATION DATE: ABNORMAL
GARDNERELLA VAGINALIS: POSITIVE
GLUCOSE P FAST SERPL-MCNC: 84 MG/DL (ref 74–106)
GLUCOSE UR STRIP-MCNC: NEGATIVE MG/DL
HCT VFR BLD AUTO: 42.9 % (ref 34–46.6)
HGB BLD-MCNC: 14.2 G/DL (ref 12–15.9)
INTERNAL NEGATIVE CONTROL: ABNORMAL
INTERNAL POSITIVE CONTROL: ABNORMAL
KETONES UR QL: NEGATIVE
LEUKOCYTE EST, POC: ABNORMAL
Lab: ABNORMAL
MCH RBC QN AUTO: 30.2 PG (ref 26.6–33)
MCHC RBC AUTO-ENTMCNC: 33.1 G/DL (ref 31.5–35.7)
MCV RBC AUTO: 91.3 FL (ref 79–97)
N GONORRHOEA RRNA SPEC QL NAA+PROBE: NOT DETECTED
NITRITE UR-MCNC: NEGATIVE MG/ML
PH UR: 5 [PH] (ref 5–8)
PLATELET # BLD AUTO: 296 10*3/MM3 (ref 140–450)
PMV BLD AUTO: 11.8 FL (ref 6–12)
PROLACTIN SERPL-MCNC: 8.06 NG/ML (ref 4.79–23.3)
PROT UR STRIP-MCNC: ABNORMAL MG/DL
RBC # BLD AUTO: 4.7 10*6/MM3 (ref 3.77–5.28)
RBC # UR STRIP: ABNORMAL /UL
SP GR UR: 1.02 (ref 1–1.03)
T VAGINALIS DNA VAG QL PROBE+SIG AMP: POSITIVE
TESTOST SERPL-MCNC: 37.3 NG/DL (ref 8.4–48.1)
TSH SERPL DL<=0.05 MIU/L-ACNC: 2.46 UIU/ML (ref 0.27–4.2)
UROBILINOGEN UR QL: ABNORMAL
WBC NRBC COR # BLD: 10.53 10*3/MM3 (ref 3.4–10.8)

## 2023-08-25 PROCEDURE — 85027 COMPLETE CBC AUTOMATED: CPT | Performed by: NURSE PRACTITIONER

## 2023-08-25 PROCEDURE — 87186 SC STD MICRODIL/AGAR DIL: CPT | Performed by: NURSE PRACTITIONER

## 2023-08-25 PROCEDURE — 87086 URINE CULTURE/COLONY COUNT: CPT | Performed by: NURSE PRACTITIONER

## 2023-08-25 PROCEDURE — 87088 URINE BACTERIA CULTURE: CPT | Performed by: NURSE PRACTITIONER

## 2023-08-25 PROCEDURE — 84443 ASSAY THYROID STIM HORMONE: CPT | Performed by: NURSE PRACTITIONER

## 2023-08-25 PROCEDURE — 82947 ASSAY GLUCOSE BLOOD QUANT: CPT | Performed by: NURSE PRACTITIONER

## 2023-08-25 PROCEDURE — 81001 URINALYSIS AUTO W/SCOPE: CPT | Performed by: NURSE PRACTITIONER

## 2023-08-25 PROCEDURE — 87660 TRICHOMONAS VAGIN DIR PROBE: CPT | Performed by: NURSE PRACTITIONER

## 2023-08-25 PROCEDURE — 87591 N.GONORRHOEAE DNA AMP PROB: CPT | Performed by: NURSE PRACTITIONER

## 2023-08-25 PROCEDURE — 87480 CANDIDA DNA DIR PROBE: CPT | Performed by: NURSE PRACTITIONER

## 2023-08-25 PROCEDURE — 84403 ASSAY OF TOTAL TESTOSTERONE: CPT | Performed by: NURSE PRACTITIONER

## 2023-08-25 PROCEDURE — 84146 ASSAY OF PROLACTIN: CPT | Performed by: NURSE PRACTITIONER

## 2023-08-25 PROCEDURE — 87491 CHLMYD TRACH DNA AMP PROBE: CPT | Performed by: NURSE PRACTITIONER

## 2023-08-25 PROCEDURE — 87510 GARDNER VAG DNA DIR PROBE: CPT | Performed by: NURSE PRACTITIONER

## 2023-08-25 NOTE — PATIENT INSTRUCTIONS
Venipuncture Blood Specimen Collection  Venipuncture performed in right arm by Huong Hill with good hemostasis. Patient tolerated the procedure well without complications.   08/25/23   Huong Hill

## 2023-08-26 LAB
BACTERIA UR QL AUTO: ABNORMAL /HPF
BILIRUB UR QL STRIP: NEGATIVE
CLARITY UR: ABNORMAL
COLOR UR: ABNORMAL
GLUCOSE UR STRIP-MCNC: NEGATIVE MG/DL
HGB UR QL STRIP.AUTO: ABNORMAL
HYALINE CASTS UR QL AUTO: ABNORMAL /LPF
KETONES UR QL STRIP: ABNORMAL
LEUKOCYTE ESTERASE UR QL STRIP.AUTO: ABNORMAL
NITRITE UR QL STRIP: NEGATIVE
PH UR STRIP.AUTO: 6 [PH] (ref 5–8)
PROT UR QL STRIP: ABNORMAL
RBC # UR STRIP: ABNORMAL /HPF
REF LAB TEST METHOD: ABNORMAL
SP GR UR STRIP: 1.03 (ref 1–1.03)
SQUAMOUS #/AREA URNS HPF: ABNORMAL /HPF
UROBILINOGEN UR QL STRIP: ABNORMAL
WBC # UR STRIP: ABNORMAL /HPF

## 2023-08-27 LAB
BACTERIA SPEC AEROBE CULT: ABNORMAL
DHEA-S SERPL-MCNC: 287 UG/DL (ref 84.8–378)
INSULIN SERPL-ACNC: 3.6 UIU/ML (ref 2.6–24.9)

## 2023-08-28 ENCOUNTER — TELEPHONE (OUTPATIENT)
Dept: OBSTETRICS AND GYNECOLOGY | Facility: CLINIC | Age: 28
End: 2023-08-28
Payer: COMMERCIAL

## 2023-08-28 DIAGNOSIS — A59.9 TRICHOMONAS INFECTION: ICD-10-CM

## 2023-08-28 DIAGNOSIS — N30.00 ACUTE CYSTITIS WITHOUT HEMATURIA: Primary | ICD-10-CM

## 2023-08-28 RX ORDER — NITROFURANTOIN 25; 75 MG/1; MG/1
100 CAPSULE ORAL 2 TIMES DAILY
Qty: 10 CAPSULE | Refills: 0 | Status: SHIPPED | OUTPATIENT
Start: 2023-08-28 | End: 2023-09-02

## 2023-08-28 RX ORDER — METRONIDAZOLE 500 MG/1
500 TABLET ORAL 2 TIMES DAILY
Qty: 14 TABLET | Refills: 0 | Status: SHIPPED | OUTPATIENT
Start: 2023-08-28 | End: 2023-09-04

## 2023-08-28 NOTE — TELEPHONE ENCOUNTER
Caller: Yajaira Lucas    Relationship: Self    Best call back number: [phone]    What is the best time to reach you: ANYTIME    Who are you requesting to speak with (clinical staff, provider,  specific staff member): CLINICAL     Do you know the name of the person who called: NO    What was the call regarding: LABS    Is it okay if the provider responds through MyChart: NO

## 2023-08-29 ENCOUNTER — TELEPHONE (OUTPATIENT)
Dept: OBSTETRICS AND GYNECOLOGY | Facility: CLINIC | Age: 28
End: 2023-08-29
Payer: COMMERCIAL

## 2023-08-29 NOTE — TELEPHONE ENCOUNTER
----- Message from RAFAEL Wade sent at 8/28/2023  9:03 AM EDT -----  Bacterial vaginosis, trichomonas and UTI detected, treatment sent to pharmacy, increase water intake, always use condoms during intercourse, recommend WILIAN 6 weeks, avoid intercourse until follow up for WILIAN,     Avoid alcohol with oral metronidazole use and up to 72 hours after discontinuation as interaction may cause headaches, sweating, flushing and severe nausea and vomiting

## 2023-08-30 LAB — 17OHP SERPL-MCNC: 111 NG/DL

## 2023-08-30 NOTE — TELEPHONE ENCOUNTER
Called patient to discuss results with her. She states someone had already contacted her with results and recommendations She states she did  both medications already. Pt stated she plans to get a second opinion from another gynecologist on all of her testing. I let her know that we do recommend she be seen in about 6 weeks to retest and make sure the infection(s) have resolved. She has her annual scheduled with  on 09/22/2023 and a three month follow up with Monica on 12/01/2. She wants to get WILIAN done at one of those visits.

## 2024-04-12 ENCOUNTER — TELEMEDICINE (OUTPATIENT)
Dept: FAMILY MEDICINE CLINIC | Facility: TELEHEALTH | Age: 29
End: 2024-04-12
Payer: COMMERCIAL

## 2024-04-12 DIAGNOSIS — G89.18 POST-OPERATIVE PAIN: Primary | ICD-10-CM

## 2024-04-12 RX ORDER — IBUPROFEN 800 MG/1
800 TABLET ORAL EVERY 8 HOURS PRN
Qty: 21 TABLET | Refills: 0 | Status: SHIPPED | OUTPATIENT
Start: 2024-04-12 | End: 2024-04-19

## 2024-04-12 RX ORDER — ACETAMINOPHEN 500 MG
500 TABLET ORAL EVERY 6 HOURS PRN
Qty: 28 TABLET | Refills: 0 | Status: SHIPPED | OUTPATIENT
Start: 2024-04-12 | End: 2024-04-19

## 2024-04-13 NOTE — PROGRESS NOTES
You have chosen to receive care through a telehealth visit.  Do you consent to use a video/audio connection for your medical care today? Yes     CHIEF COMPLAINT  Chief Complaint   Patient presents with    Post-op Problem         HPI  Yajaira Lucas is a 28 y.o. female  presents with complaint of post op pain.  She had a procedure today and is having pain.  She states that they prescribed her Tylenol Motrin and tramadol, but did not send it to her pharmacy    Review of Systems   Gastrointestinal:  Positive for abdominal pain (post op).   All other systems reviewed and are negative.      Past Medical History:   Diagnosis Date    Allergic     Anesthesia     SLOW TO WAKE UP POSTOP    Anxiety     Depression     Nosebleed     Pelvic pain        Family History   Problem Relation Age of Onset    Cancer Father         ORAL    Cancer Maternal Grandfather         LUNG    Diabetes Maternal Uncle     Breast cancer Neg Hx     Ovarian cancer Neg Hx     Uterine cancer Neg Hx     Prostate cancer Neg Hx     Colon cancer Neg Hx        Social History     Socioeconomic History    Marital status: Single    Number of children: 1   Tobacco Use    Smoking status: Every Day     Current packs/day: 0.50     Average packs/day: 0.5 packs/day for 5.0 years (2.5 ttl pk-yrs)     Types: Cigarettes    Smokeless tobacco: Never    Tobacco comments:     INSTRUCTED NO SMOKING 24 HR PRIOR TO SURGERY -last smoked this am   Vaping Use    Vaping status: Never Used   Substance and Sexual Activity    Alcohol use: No    Drug use: No    Sexual activity: Yes     Partners: Male     Birth control/protection: None       Yajaira Lucas  reports that she has been smoking cigarettes. She has a 2.5 pack-year smoking history. She has never used smokeless tobacco. I have educated her on the risk of diseases from using tobacco products such as cancer, COPD, and heart disease.     I advised her to quit and she is not willing to quit.    I spent  1  minutes counseling the  patient.              There were no vitals taken for this visit.    PHYSICAL EXAM  Physical Exam   Constitutional: She appears well-developed and well-nourished.   HENT:   Head: Normocephalic.   Eyes: Pupils are equal, round, and reactive to light.   Pulmonary/Chest: Effort normal.   Musculoskeletal: Normal range of motion.   Neurological: She is alert.   Psychiatric: She has a normal mood and affect.       Results for orders placed or performed in visit on 08/25/23   Urine Culture - Urine, Urine, Clean Catch    Specimen: Urine, Clean Catch   Result Value Ref Range    Urine Culture >100,000 CFU/mL Escherichia coli (A)        Susceptibility    Escherichia coli - TA     Ampicillin  Susceptible ug/ml     Ampicillin + Sulbactam  Susceptible ug/ml     Cefazolin  Susceptible ug/ml     Cefepime  Susceptible ug/ml     Ceftazidime  Susceptible ug/ml     Ceftriaxone  Susceptible ug/ml     Gentamicin  Susceptible ug/ml     Levofloxacin  Susceptible ug/ml     Nitrofurantoin  Susceptible ug/ml     Piperacillin + Tazobactam  Susceptible ug/ml     Trimethoprim + Sulfamethoxazole  Susceptible ug/ml   Chlamydia trachomatis, Neisseria gonorrhoeae, PCR - Swab, Cervix    Specimen: Cervix; Swab   Result Value Ref Range    Chlamydia DNA by PCR Not Detected Not Detected     Neisseria gonorrhoeae by PCR Not Detected Not Detected    Gardnerella vaginalis, Trichomonas vaginalis, Candida albicans, DNA - Swab, Vagina    Specimen: Vagina; Swab   Result Value Ref Range    GARDNERELLA VAGINALIS Positive (A) Negative    TRICHOMONAS VAGINALIS Positive (A) Negative    CANDIDA SPECIES Negative Negative   Urinalysis without microscopic (no culture) - Urine, Clean Catch    Specimen: Urine, Clean Catch   Result Value Ref Range    Color, UA Dark Yellow (A) Yellow, Straw    Appearance, UA Cloudy (A) Clear    pH, UA 6.0 5.0 - 8.0    Specific Gravity, UA 1.027 1.005 - 1.030    Glucose, UA Negative Negative    Ketones, UA Trace (A) Negative    Bilirubin, UA  Negative Negative    Blood, UA Moderate (2+) (A) Negative    Protein,  mg/dL (2+) (A) Negative    Leuk Esterase, UA Moderate (2+) (A) Negative    Nitrite, UA Negative Negative    Urobilinogen, UA 1.0 E.U./dL 0.2 - 1.0 E.U./dL   Urinalysis, Microscopic Only - Urine, Clean Catch    Specimen: Urine, Clean Catch   Result Value Ref Range    RBC, UA 6-12 (A) None Seen, 0-2 /HPF    WBC, UA 21-30 (A) None Seen, 0-2 /HPF    Bacteria, UA 1+ (A) None Seen /HPF    Squamous Epithelial Cells, UA 3-6 (A) None Seen, 0-2 /HPF    Hyaline Casts, UA None Seen None Seen /LPF    Methodology Manual Light Microscopy    Prolactin    Specimen: Arm, Right; Blood   Result Value Ref Range    Prolactin 8.06 4.79 - 23.30 ng/mL   TSH    Specimen: Arm, Right; Blood   Result Value Ref Range    TSH 2.460 0.270 - 4.200 uIU/mL   CBC (No Diff)    Specimen: Arm, Right; Blood   Result Value Ref Range    WBC 10.53 3.40 - 10.80 10*3/mm3    RBC 4.70 3.77 - 5.28 10*6/mm3    Hemoglobin 14.2 12.0 - 15.9 g/dL    Hematocrit 42.9 34.0 - 46.6 %    MCV 91.3 79.0 - 97.0 fL    MCH 30.2 26.6 - 33.0 pg    MCHC 33.1 31.5 - 35.7 g/dL    RDW 13.0 12.3 - 15.4 %    RDW-SD 43.8 37.0 - 54.0 fl    MPV 11.8 6.0 - 12.0 fL    Platelets 296 140 - 450 10*3/mm3   DHEA-Sulfate    Specimen: Arm, Right; Blood   Result Value Ref Range    DHEA-Sulfate 287.0 84.8 - 378.0 ug/dL   Insulin, Total    Specimen: Arm, Right; Blood   Result Value Ref Range    Insulin 3.6 2.6 - 24.9 uIU/mL   Glucose, Fasting    Specimen: Arm, Right; Blood   Result Value Ref Range    Glucose, Fasting 84 74 - 106 mg/dL   Testosterone    Specimen: Arm, Right; Blood   Result Value Ref Range    Testosterone, Total 37.30 8.40 - 48.10 ng/dL   17-Hydroxyprogesterone    Specimen: Arm, Right; Blood   Result Value Ref Range    17-OH Progesterone LCMS 111 ng/dL   POC Pregnancy, Urine    Specimen: Urine   Result Value Ref Range    HCG, Urine, QL Negative Negative    Lot Number [id number]     Internal Positive Control Passed  (A) Positive, Passed    Internal Negative Control Passed Negative, Passed    Expiration Date 10/06/2024    POC Urinalysis Dipstick    Specimen: Urine   Result Value Ref Range    Glucose, UA Negative Negative mg/dL    Bilirubin 1 mg/dL (A) Negative    Ketones, UA Negative Negative    Specific Gravity  1.020 1.005 - 1.030    Blood, UA 2+ (A) Negative    pH, Urine 5.0 5.0 - 8.0    Protein,  mg/dL (A) Negative mg/dL    Urobilinogen, UA 1 E.U./dL (A) Normal, 0.2 E.U./dL    Leukocytes 75 Lakeisha/ul (A) Negative    Nitrite, UA Negative Negative       Diagnoses and all orders for this visit:    1. Post-operative pain (Primary)  -     ibuprofen (ADVIL,MOTRIN) 800 MG tablet; Take 1 tablet by mouth Every 8 (Eight) Hours As Needed for Mild Pain for up to 7 days.  Dispense: 21 tablet; Refill: 0  -     acetaminophen (TYLENOL) 500 MG tablet; Take 1 tablet by mouth Every 6 (Six) Hours As Needed for Mild Pain for up to 7 days.  Dispense: 28 tablet; Refill: 0          FOLLOW-UP  As discussed during visit with PCP/[facility] if no improvement or Urgent Care/Emergency Department if worsening of symptoms    Patient verbalizes understanding of medication dosage, comfort measures, instructions for treatment and follow-up.    RAFAEL Pedraza  04/12/2024  20:45 EDT    The use of a video visit has been reviewed with the patient and verbal informed consent has been obtained. Myself and Yajaira Lucas participated in this visit. The patient is located in [address] [place] [address] [address] [place] [address].    I am located in [place], [place]. Mychart and Twilio were utilized. I spent 10 minutes in the patient's chart for this visit.      Note Disclaimer: At [facility], we believe that sharing information builds trust and better   relationships. You are receiving this note because you recently visited [facility]. It is possible you   will see health information before a provider has talked with you about it. This kind of information can   be easy  to misunderstand. To help you fully understand what it means for your health, we urge you to   discuss this note with your provider.

## 2024-04-27 ENCOUNTER — TELEMEDICINE (OUTPATIENT)
Dept: FAMILY MEDICINE CLINIC | Facility: TELEHEALTH | Age: 29
End: 2024-04-27
Payer: COMMERCIAL

## 2024-04-27 DIAGNOSIS — K04.7 DENTAL ABSCESS: Primary | ICD-10-CM

## 2024-04-27 RX ORDER — LIDOCAINE HYDROCHLORIDE 20 MG/ML
10 SOLUTION OROPHARYNGEAL
Status: SHIPPED | OUTPATIENT
Start: 2024-04-27

## 2024-04-27 RX ORDER — PENICILLIN V POTASSIUM 500 MG/1
500 TABLET ORAL 4 TIMES DAILY
Qty: 28 TABLET | Refills: 0 | Status: SHIPPED | OUTPATIENT
Start: 2024-04-27 | End: 2024-04-27

## 2024-04-27 RX ORDER — IBUPROFEN 800 MG/1
800 TABLET ORAL EVERY 8 HOURS PRN
Qty: 30 TABLET | Refills: 0 | Status: SHIPPED | OUTPATIENT
Start: 2024-04-27 | End: 2024-05-07

## 2024-04-28 NOTE — PROGRESS NOTES
You have chosen to receive care through a telehealth visit.  Do you consent to use a video/audio connection for your medical care today? Yes     CHIEF COMPLAINT  Chief Complaint   Patient presents with    Dental Pain         HPI  Yajaira Lucas is a 28 y.o. female  presents with complaint of   Toothache with abscess to back tooth on left upper side that started yesterday.  Review of Systems   HENT:  Positive for dental problem.    All other systems reviewed and are negative.      Past Medical History:   Diagnosis Date    Allergic     Anesthesia     SLOW TO WAKE UP POSTOP    Anxiety     Depression     Nosebleed     Pelvic pain        Family History   Problem Relation Age of Onset    Cancer Father         ORAL    Cancer Maternal Grandfather         LUNG    Diabetes Maternal Uncle     Breast cancer Neg Hx     Ovarian cancer Neg Hx     Uterine cancer Neg Hx     Prostate cancer Neg Hx     Colon cancer Neg Hx        Social History     Socioeconomic History    Marital status: Single    Number of children: 1   Tobacco Use    Smoking status: Every Day     Current packs/day: 0.50     Average packs/day: 0.5 packs/day for 5.0 years (2.5 ttl pk-yrs)     Types: Cigarettes    Smokeless tobacco: Never    Tobacco comments:     INSTRUCTED NO SMOKING 24 HR PRIOR TO SURGERY -last smoked this am   Vaping Use    Vaping status: Never Used   Substance and Sexual Activity    Alcohol use: No    Drug use: No    Sexual activity: Yes     Partners: Male     Birth control/protection: None       Yajaira Lucas  reports that she has been smoking cigarettes. She has a 2.5 pack-year smoking history. She has never used smokeless tobacco. I have educated her on the risk of diseases from using tobacco products such as cancer, COPD, and heart disease.     I advised her to quit and she is not willing to quit.    I spent  1  minutes counseling the patient.              There were no vitals taken for this visit.    PHYSICAL EXAM  Physical Exam    Constitutional: She appears well-developed and well-nourished.   Eyes: Pupils are equal, round, and reactive to light.   Pulmonary/Chest: Effort normal.   Musculoskeletal: Normal range of motion.   Neurological: She is alert.   Psychiatric: She has a normal mood and affect.       Results for orders placed or performed in visit on 08/25/23   Urine Culture - Urine, Urine, Clean Catch    Specimen: Urine, Clean Catch   Result Value Ref Range    Urine Culture >100,000 CFU/mL Escherichia coli (A)        Susceptibility    Escherichia coli - TA     Ampicillin  Susceptible ug/ml     Ampicillin + Sulbactam  Susceptible ug/ml     Cefazolin  Susceptible ug/ml     Cefepime  Susceptible ug/ml     Ceftazidime  Susceptible ug/ml     Ceftriaxone  Susceptible ug/ml     Gentamicin  Susceptible ug/ml     Levofloxacin  Susceptible ug/ml     Nitrofurantoin  Susceptible ug/ml     Piperacillin + Tazobactam  Susceptible ug/ml     Trimethoprim + Sulfamethoxazole  Susceptible ug/ml   Chlamydia trachomatis, Neisseria gonorrhoeae, PCR - Swab, Cervix    Specimen: Cervix; Swab   Result Value Ref Range    Chlamydia DNA by PCR Not Detected Not Detected     Neisseria gonorrhoeae by PCR Not Detected Not Detected    Gardnerella vaginalis, Trichomonas vaginalis, Candida albicans, DNA - Swab, Vagina    Specimen: Vagina; Swab   Result Value Ref Range    GARDNERELLA VAGINALIS Positive (A) Negative    TRICHOMONAS VAGINALIS Positive (A) Negative    CANDIDA SPECIES Negative Negative   Urinalysis without microscopic (no culture) - Urine, Clean Catch    Specimen: Urine, Clean Catch   Result Value Ref Range    Color, UA Dark Yellow (A) Yellow, Straw    Appearance, UA Cloudy (A) Clear    pH, UA 6.0 5.0 - 8.0    Specific Gravity, UA 1.027 1.005 - 1.030    Glucose, UA Negative Negative    Ketones, UA Trace (A) Negative    Bilirubin, UA Negative Negative    Blood, UA Moderate (2+) (A) Negative    Protein,  mg/dL (2+) (A) Negative    Leuk Esterase, UA  Moderate (2+) (A) Negative    Nitrite, UA Negative Negative    Urobilinogen, UA 1.0 E.U./dL 0.2 - 1.0 E.U./dL   Urinalysis, Microscopic Only - Urine, Clean Catch    Specimen: Urine, Clean Catch   Result Value Ref Range    RBC, UA 6-12 (A) None Seen, 0-2 /HPF    WBC, UA 21-30 (A) None Seen, 0-2 /HPF    Bacteria, UA 1+ (A) None Seen /HPF    Squamous Epithelial Cells, UA 3-6 (A) None Seen, 0-2 /HPF    Hyaline Casts, UA None Seen None Seen /LPF    Methodology Manual Light Microscopy    Prolactin    Specimen: Arm, Right; Blood   Result Value Ref Range    Prolactin 8.06 4.79 - 23.30 ng/mL   TSH    Specimen: Arm, Right; Blood   Result Value Ref Range    TSH 2.460 0.270 - 4.200 uIU/mL   CBC (No Diff)    Specimen: Arm, Right; Blood   Result Value Ref Range    WBC 10.53 3.40 - 10.80 10*3/mm3    RBC 4.70 3.77 - 5.28 10*6/mm3    Hemoglobin 14.2 12.0 - 15.9 g/dL    Hematocrit 42.9 34.0 - 46.6 %    MCV 91.3 79.0 - 97.0 fL    MCH 30.2 26.6 - 33.0 pg    MCHC 33.1 31.5 - 35.7 g/dL    RDW 13.0 12.3 - 15.4 %    RDW-SD 43.8 37.0 - 54.0 fl    MPV 11.8 6.0 - 12.0 fL    Platelets 296 140 - 450 10*3/mm3   DHEA-Sulfate    Specimen: Arm, Right; Blood   Result Value Ref Range    DHEA-Sulfate 287.0 84.8 - 378.0 ug/dL   Insulin, Total    Specimen: Arm, Right; Blood   Result Value Ref Range    Insulin 3.6 2.6 - 24.9 uIU/mL   Glucose, Fasting    Specimen: Arm, Right; Blood   Result Value Ref Range    Glucose, Fasting 84 74 - 106 mg/dL   Testosterone    Specimen: Arm, Right; Blood   Result Value Ref Range    Testosterone, Total 37.30 8.40 - 48.10 ng/dL   17-Hydroxyprogesterone    Specimen: Arm, Right; Blood   Result Value Ref Range    17-OH Progesterone LCMS 111 ng/dL   POC Pregnancy, Urine    Specimen: Urine   Result Value Ref Range    HCG, Urine, QL Negative Negative    Lot Number [id number]     Internal Positive Control Passed (A) Positive, Passed    Internal Negative Control Passed Negative, Passed    Expiration Date 10/06/2024    POC Urinalysis  Dipstick    Specimen: Urine   Result Value Ref Range    Glucose, UA Negative Negative mg/dL    Bilirubin 1 mg/dL (A) Negative    Ketones, UA Negative Negative    Specific Gravity  1.020 1.005 - 1.030    Blood, UA 2+ (A) Negative    pH, Urine 5.0 5.0 - 8.0    Protein,  mg/dL (A) Negative mg/dL    Urobilinogen, UA 1 E.U./dL (A) Normal, 0.2 E.U./dL    Leukocytes 75 Lakeisha/ul (A) Negative    Nitrite, UA Negative Negative       Diagnoses and all orders for this visit:    1. Dental abscess (Primary)  -     ibuprofen (ADVIL,MOTRIN) 800 MG tablet; Take 1 tablet by mouth Every 8 (Eight) Hours As Needed for Mild Pain for up to 10 days.  Dispense: 30 tablet; Refill: 0  -     Lidocaine Viscous HCl (XYLOCAINE) 2 % solution 10 mL  -     penicillin v potassium (VEETID) 250 MG/5ML suspension; Take 10 mL by mouth 4 (Four) Times a Day for 7 days.  Dispense: 280 mL; Refill: 0    Other orders  -     Discontinue: penicillin v potassium (VEETID) 500 MG tablet; Take 1 tablet by mouth 4 (Four) Times a Day for 7 days.  Dispense: 28 tablet; Refill: 0          FOLLOW-UP  As discussed during visit with PCP/[facility] if no improvement or Urgent Care/Emergency Department if worsening of symptoms    Patient verbalizes understanding of medication dosage, comfort measures, instructions for treatment and follow-up.    RAFAEL Pedraza  04/27/2024  22:30 EDT    The use of a video visit has been reviewed with the patient and verbal informed consent has been obtained. Myself and Yajaira Lucas participated in this visit. The patient is located in [address] [place] [address] [address] [place] [address].    I am located in [place], [place]. [employer] and [employer] were utilized. I spent 10 minutes in the patient's chart for this visit.      Note Disclaimer: At [facility], we believe that sharing information builds trust and better   relationships. You are receiving this note because you recently visited [facility]. It is possible you   will see health information  before a provider has talked with you about it. This kind of information can   be easy to misunderstand. To help you fully understand what it means for your health, we urge you to   discuss this note with your provider.

## 2025-05-07 ENCOUNTER — HOSPITAL ENCOUNTER (OUTPATIENT)
Dept: GENERAL RADIOLOGY | Facility: HOSPITAL | Age: 30
Discharge: HOME | End: 2025-05-07
Payer: COMMERCIAL

## 2025-05-07 ENCOUNTER — TRANSCRIBE ORDERS (OUTPATIENT)
Dept: ADMINISTRATIVE | Facility: HOSPITAL | Age: 30
End: 2025-05-07
Payer: COMMERCIAL

## 2025-05-07 DIAGNOSIS — M54.50 LOW BACK PAIN, UNSPECIFIED BACK PAIN LATERALITY, UNSPECIFIED CHRONICITY, UNSPECIFIED WHETHER SCIATICA PRESENT: ICD-10-CM

## 2025-05-07 DIAGNOSIS — M54.2 NECK PAIN: Primary | ICD-10-CM

## 2025-05-07 DIAGNOSIS — M54.2 NECK PAIN: ICD-10-CM

## 2025-05-07 PROCEDURE — 72050 X-RAY EXAM NECK SPINE 4/5VWS: CPT

## 2025-05-07 PROCEDURE — 72110 X-RAY EXAM L-2 SPINE 4/>VWS: CPT

## 2025-05-28 ENCOUNTER — TRANSCRIBE ORDERS (OUTPATIENT)
Dept: ADMINISTRATIVE | Facility: HOSPITAL | Age: 30
End: 2025-05-28
Payer: COMMERCIAL

## 2025-05-28 DIAGNOSIS — R19.7 DIARRHEA, UNSPECIFIED TYPE: Primary | ICD-10-CM

## 2025-05-30 ENCOUNTER — TRANSCRIBE ORDERS (OUTPATIENT)
Dept: ADMINISTRATIVE | Facility: HOSPITAL | Age: 30
End: 2025-05-30
Payer: COMMERCIAL

## 2025-05-30 DIAGNOSIS — R06.02 SHORTNESS OF BREATH: Primary | ICD-10-CM

## 2025-06-02 ENCOUNTER — HOSPITAL ENCOUNTER (OUTPATIENT)
Dept: CT IMAGING | Facility: HOSPITAL | Age: 30
Discharge: HOME | End: 2025-06-02
Payer: COMMERCIAL

## 2025-06-02 DIAGNOSIS — R06.02 SHORTNESS OF BREATH: ICD-10-CM

## 2025-06-02 PROCEDURE — 25510000001 IOPAMIDOL PER 1 ML

## 2025-06-02 PROCEDURE — 71260 CT THORAX DX C+: CPT

## 2025-06-02 RX ORDER — IOPAMIDOL 755 MG/ML
100 INJECTION, SOLUTION INTRAVASCULAR
Status: COMPLETED | OUTPATIENT
Start: 2025-06-02 | End: 2025-06-02

## 2025-06-02 RX ADMIN — IOPAMIDOL 100 ML: 755 INJECTION, SOLUTION INTRAVENOUS at 15:39

## 2025-07-01 NOTE — PROGRESS NOTES
Primary Care Provider  Paulina Hardy APRN   Referring Provider  RAFAEL Holder    Patient Complaint  Establish Care, Nodule, Wheezing, and Shortness of Breath      Subjective     Yajaira Lucas is a 29 y.o. female with pertinent past medical history of shortness of breath, tobacco use, family history of lung cancer who presents as a new patient to [facility] for abnormal CT of chest 7mm well circumscribed pleural based nodule in the medial left lung apex       History of Present Illness  The patient is a 29-year-old female who presents for evaluation of a lung nodule.    She was referred to our clinic following a CT scan of her chest, which revealed a lung nodule. The scan was performed due to her complaints of shortness of breath, a symptom she has been experiencing intermittently for several years. This breathlessness occurs even at rest and is often accompanied by a dry cough, particularly severe in the mornings. She also reports occasional wheezing. She has not sought emergency care for these symptoms but has consulted her primary care physician, who ordered the CT scan.    She has previously found relief from a steroid taper with prednisone but has not used this treatment recently. She has not used an albuterol inhaler for the past few weeks but previously used it 2 to 3 times per week.    She does not work outside the home   She occasionally uses a wood-burning stove.   She has cats as pets but does not believe they contribute to her breathing difficulties.  There is a family history of lung cancer on her mother's side, with her grandfather having passed away from the disease.   She smokes half a pack of cigarettes per day and has been smoking since she was 14 years old.    SOCIAL HISTORY  Tobacco: The patient smokes half a pack of cigarettes a day and started smoking about 15 years ago when she was 14.    FAMILY HISTORY  - Maternal grandfather:  Lung cancer,     N/A for other first-degree relatives.        At present time patient denies headaches, chest pain, weight loss or hemoptysis. Patient denies fevers, chills and night sweats. Yajaira Lucas is able to perform ADLs.      I have personally reviewed the review of systems, past family, social, medical and surgical histories; and agree with their findings.      Family History   Problem Relation Age of Onset    Cancer Father         ORAL    Cancer Maternal Grandfather         LUNG    Diabetes Maternal Uncle     Breast cancer Neg Hx     Ovarian cancer Neg Hx     Uterine cancer Neg Hx     Prostate cancer Neg Hx     Colon cancer Neg Hx         Social History     Socioeconomic History    Marital status: Single    Number of children: 1   Tobacco Use    Smoking status: Every Day     Current packs/day: 0.50     Average packs/day: 0.5 packs/day for 15.5 years (7.7 ttl pk-yrs)     Types: Cigarettes     Start date:     Smokeless tobacco: Never    Tobacco comments:     INSTRUCTED NO SMOKING 24 HR PRIOR TO SURGERY -last smoked this am   Vaping Use    Vaping status: Never Used   Substance and Sexual Activity    Alcohol use: No    Drug use: No    Sexual activity: Yes     Partners: Male     Birth control/protection: None        Past Medical History:   Diagnosis Date    Allergic     Anesthesia     SLOW TO WAKE UP POSTOP    Anxiety     Depression     Nosebleed     Pelvic pain         Immunization History   Administered Date(s) Administered    Hep B, Adolescent or Pediatric 2009, 10/05/2009, 2010    IPV 2009    MMR 2009, 10/05/2009    Meningococcal Polysaccharide 2009    Tdap 2009, 2020    Varicella 2009       Allergies   Allergen Reactions    Latex Hives          Current Outpatient Medications:     Abilify Asimtufii 960 MG/3.2ML prefilled syringe, , Disp: , Rfl:     albuterol sulfate  (90 Base) MCG/ACT inhaler, Inhale 2 puffs Every 4 (Four) Hours As Needed  "for Shortness of Air or Wheezing., Disp: 18 g, Rfl: 5    Blood Glucose Monitoring Suppl (FreeStyle Lite) w/Device kit, 1 each by Other route 3 (Three) Times a Day., Disp: , Rfl:     cetirizine (zyrTEC) 5 MG tablet, Take 1 tablet by mouth Daily., Disp: , Rfl:     folic acid (FOLVITE) 1 MG tablet, Take 1 tablet by mouth Daily., Disp: , Rfl:     FREESTYLE LITE test strip, 1 each by Other route 3 (Three) Times a Day., Disp: , Rfl:     ibuprofen (ADVIL,MOTRIN) 600 MG tablet, TAKE 1 TABLET BY MOUTH EVERY 6 HOURS AS NEEDED PAIN FOR 10 DAYS, Disp: , Rfl:     norgestrel-ethinyl estradiol (LOW-OGESTREL,CRYSELLE) 0.3-30 MG-MCG per tablet, Take 1 tablet by mouth Daily., Disp: 90 tablet, Rfl: 3    ondansetron ODT (ZOFRAN-ODT) 4 MG disintegrating tablet, DISSOLVE 1 TABLET IN MOUTH EVERY 8 HOURS AS NEEDED FOR NAUSEA, Disp: , Rfl:     vitamin D (ERGOCALCIFEROL) 1.25 MG (29721 UT) capsule capsule, Take 1 capsule by mouth 1 (One) Time Per Week., Disp: , Rfl:     predniSONE (DELTASONE) 20 MG tablet, Take 2 tablets by mouth Daily for 5 days., Disp: 10 tablet, Rfl: 0    Current Facility-Administered Medications:     Lidocaine Viscous HCl (XYLOCAINE) 2 % solution 10 mL, 10 mL, Mouth/Throat, Q3H PRN, Elisa Sargent, APRN       Objective       Physical Exam  Vital Signs:   /87 (BP Location: Left arm, Patient Position: Sitting, Cuff Size: Adult)   Pulse 102   Temp 98 °F (36.7 °C) (Temporal)   Resp 18   Ht 149.9 cm (59\")   Wt 71.2 kg (157 lb)   SpO2 97% Comment: Room air  BMI 31.71 kg/m²   Body mass index is 31.71 kg/m².  Vital signs and BMI reviewed  General: WDWN, Alert, NAD.    HEENT: ATNC.  MMM  Chest:  good aeration, clear to auscultation bilaterally, no increased work of breathing, normal symmetric chest wall rise bilaterally  CV: RRR, no MGR  Neuro:  Awake, conversant, answering questions appropriately          Results Review  I have personally reviewed the prior office notes, hospital records, labs, and " diagnostics.  [x] CMP no hypercarbia noted in 2024  [x] CBC with differential no eosinophilia noted in 2024  [x]  CT of Chest obtained in June 2025.  7 mm nodule in left upper lobe          []  PFT  []  6 minute walk  []  2D echo  []  PET scan  [] Alpha-1 antitrypsin      Results  Imaging   - CT of the chest: 7 mm nodule in the left upper lobe            Assessment         Patient Active Problem List   Diagnosis    Anxiety    Tobacco abuse    Pelvic pain    Bilateral hearing loss    Bipolar 2 disorder    Cervical radiculopathy    Chronic back pain    Pain in thoracic spine    Radiculopathy due to lumbar intervertebral disc disorder    Dyspareunia in female    S/P laparoscopy    Depressive disorder    Postoperative visit       Diagnoses and all orders for this visit:    1. Shortness of breath (Primary)  -     Complete PFT - Pre & Post Bronchodilator; Future  -     predniSONE (DELTASONE) 20 MG tablet; Take 2 tablets by mouth Daily for 5 days.  Dispense: 10 tablet; Refill: 0  -     albuterol sulfate  (90 Base) MCG/ACT inhaler; Inhale 2 puffs Every 4 (Four) Hours As Needed for Shortness of Air or Wheezing.  Dispense: 18 g; Refill: 5  -     CT Chest Without Contrast; Future    2. Family history of lung cancer  -     Complete PFT - Pre & Post Bronchodilator; Future  -     predniSONE (DELTASONE) 20 MG tablet; Take 2 tablets by mouth Daily for 5 days.  Dispense: 10 tablet; Refill: 0  -     albuterol sulfate  (90 Base) MCG/ACT inhaler; Inhale 2 puffs Every 4 (Four) Hours As Needed for Shortness of Air or Wheezing.  Dispense: 18 g; Refill: 5  -     CT Chest Without Contrast; Future    3. Chronic cough  -     Complete PFT - Pre & Post Bronchodilator; Future  -     predniSONE (DELTASONE) 20 MG tablet; Take 2 tablets by mouth Daily for 5 days.  Dispense: 10 tablet; Refill: 0  -     albuterol sulfate  (90 Base) MCG/ACT inhaler; Inhale 2 puffs Every 4 (Four) Hours As Needed for Shortness of Air or Wheezing.   Dispense: 18 g; Refill: 5  -     CT Chest Without Contrast; Future    4. Tobacco use  -     Complete PFT - Pre & Post Bronchodilator; Future  -     predniSONE (DELTASONE) 20 MG tablet; Take 2 tablets by mouth Daily for 5 days.  Dispense: 10 tablet; Refill: 0  -     albuterol sulfate  (90 Base) MCG/ACT inhaler; Inhale 2 puffs Every 4 (Four) Hours As Needed for Shortness of Air or Wheezing.  Dispense: 18 g; Refill: 5  -     CT Chest Without Contrast; Future    5. Lung nodule seen on imaging study  -     Complete PFT - Pre & Post Bronchodilator; Future  -     predniSONE (DELTASONE) 20 MG tablet; Take 2 tablets by mouth Daily for 5 days.  Dispense: 10 tablet; Refill: 0  -     albuterol sulfate  (90 Base) MCG/ACT inhaler; Inhale 2 puffs Every 4 (Four) Hours As Needed for Shortness of Air or Wheezing.  Dispense: 18 g; Refill: 5  -     CT Chest Without Contrast; Future       Plan        Assessment & Plan  1. Lung nodule.  A 7 mm nodule was identified in the left upper lobe on the CT scan. Given her family history of lung cancer and tobacco use, close monitoring is necessary. A follow-up CT scan of the chest will be scheduled within the next 3 to 4 months. If the nodule remains stable or disappears, no further action will be required. If it grows, a biopsy will be considered.    2. Shortness of breath.  The shortness of breath may be due to underlying asthma, airway disease from environmental irritants, smoking, or allergies. A pulmonary function test (PFT) will be ordered to assess lung function. A prescription for prednisone and a refill for her albuterol inhaler have been provided. If the prednisone helps but symptoms return after discontinuation, she should contact the office. If the PFT results are normal, alternative causes for her symptoms will be explored.    3. Cough.  The cough is described as dry and occurring daily, worse in the morning. It may be related to underlying asthma or airway irritation.  The prednisone and albuterol inhaler may help alleviate the cough. If the cough persists despite these treatments, further evaluation will be necessary.    Follow-up  Follow-up in November after the CT scan and PFT. If symptoms persist or worsen, contact the office for further evaluation.        Smoking status:  reports that she has been smoking cigarettes. She started smoking about 15 years ago. She has a 7.7 pack-year smoking history. She has never used smokeless tobacco.    Vaccination status: Reviewed and declined updated vaccines  Immunization History   Administered Date(s) Administered    Hep B, Adolescent or Pediatric 08/05/2009, 10/05/2009, 02/05/2010    IPV 09/09/2009    MMR 08/05/2009, 10/05/2009    Meningococcal Polysaccharide 09/09/2009    Tdap 08/05/2009, 07/13/2020    Varicella 08/05/2009        Medications personally reviewed    Follow Up  Return in about 4 months (around 11/10/2025).    Patient was given instructions and counseling regarding her condition or for health maintenance advice. Please see specific information pulled into the AVS if appropriate.     I spent 45 minutes caring for Yajaira Lucas on this date of service. This time includes time spent by me in the following activities:preparing for the visit, reviewing tests, obtaining and/or reviewing a separately obtained history, performing a medically appropriate examination and/or evaluation, counseling and educating the patient/family/caregiver, ordering medications, tests, or procedures, documenting information in the medical record, independently interpreting results and communicating that information with the patient/family/caregiver and answered questions family members, discuss medications.     Patient or patient representative verbalized consent for the use of Ambient Listening during the visit with  LUIS ANGEL Moscoso for chart documentation. 7/2/2025  08:35 EDT    LUIS ANGEL Moscoso  07/02/25  08:35 EDT

## 2025-07-02 ENCOUNTER — OFFICE VISIT (OUTPATIENT)
Dept: PULMONOLOGY | Facility: CLINIC | Age: 30
End: 2025-07-02
Payer: COMMERCIAL

## 2025-07-02 VITALS
TEMPERATURE: 98 F | BODY MASS INDEX: 31.65 KG/M2 | DIASTOLIC BLOOD PRESSURE: 87 MMHG | WEIGHT: 157 LBS | HEART RATE: 102 BPM | SYSTOLIC BLOOD PRESSURE: 114 MMHG | HEIGHT: 59 IN | OXYGEN SATURATION: 97 % | RESPIRATION RATE: 18 BRPM

## 2025-07-02 DIAGNOSIS — Z72.0 TOBACCO USE: ICD-10-CM

## 2025-07-02 DIAGNOSIS — R05.3 CHRONIC COUGH: ICD-10-CM

## 2025-07-02 DIAGNOSIS — R06.02 SHORTNESS OF BREATH: Primary | ICD-10-CM

## 2025-07-02 DIAGNOSIS — Z80.1 FAMILY HISTORY OF LUNG CANCER: ICD-10-CM

## 2025-07-02 DIAGNOSIS — R91.1 LUNG NODULE SEEN ON IMAGING STUDY: ICD-10-CM

## 2025-07-02 RX ORDER — ALBUTEROL SULFATE 90 UG/1
2 INHALANT RESPIRATORY (INHALATION) EVERY 4 HOURS PRN
Qty: 18 G | Refills: 5 | Status: SHIPPED | OUTPATIENT
Start: 2025-07-02

## 2025-07-02 RX ORDER — CETIRIZINE HYDROCHLORIDE 5 MG/1
5 TABLET ORAL DAILY
COMMUNITY

## 2025-07-02 RX ORDER — IBUPROFEN 600 MG/1
TABLET, FILM COATED ORAL
COMMUNITY

## 2025-07-02 RX ORDER — PREDNISONE 20 MG/1
40 TABLET ORAL DAILY
Qty: 10 TABLET | Refills: 0 | Status: SHIPPED | OUTPATIENT
Start: 2025-07-02 | End: 2025-07-07

## 2025-07-02 RX ORDER — ONDANSETRON 4 MG/1
TABLET, ORALLY DISINTEGRATING ORAL
COMMUNITY

## 2025-07-02 RX ORDER — BLOOD-GLUCOSE METER
1 KIT MISCELLANEOUS 3 TIMES DAILY
COMMUNITY
Start: 2025-06-12

## 2025-07-02 RX ORDER — ARIPIPRAZOLE 960 MG/3.2ML
INJECTION, SUSPENSION, EXTENDED RELEASE INTRAMUSCULAR
COMMUNITY
Start: 2025-06-09

## 2025-07-02 RX ORDER — BLOOD-GLUCOSE METER
1 KIT MISCELLANEOUS 3 TIMES DAILY
COMMUNITY
Start: 2025-05-12

## 2025-07-02 RX ORDER — ERGOCALCIFEROL 1.25 MG/1
1 CAPSULE, LIQUID FILLED ORAL WEEKLY
COMMUNITY
Start: 2025-06-05

## 2025-07-02 RX ORDER — FOLIC ACID 1 MG/1
1 TABLET ORAL DAILY
COMMUNITY
Start: 2025-06-05

## (undated) DEVICE — SUT MNCRYL PLS ANTIB UD 4/0 PS2 18IN

## (undated) DEVICE — DUAL LUMEN STOMACH TUBE,ANTI-REFLUX VALVE: Brand: SALEM SUMP

## (undated) DEVICE — COVADERM PLUS: Brand: DEROYAL

## (undated) DEVICE — LITHOTOMY-YELLOW FINS: Brand: MEDLINE INDUSTRIES, INC.

## (undated) DEVICE — SYR LUERLOK 30CC

## (undated) DEVICE — KT ANTI FOG W/FLD AND SPNG

## (undated) DEVICE — GLV SURG BIOGEL LTX PF 7

## (undated) DEVICE — PAD GRND REM POLYHESIVE A/ DISP

## (undated) DEVICE — APPL CHLORAPREP HI/LITE 26ML ORNG

## (undated) DEVICE — PCH SURG INST LAP 2 LF

## (undated) DEVICE — NDL HYPO ECLPS SFTY 22G 1 1/2IN

## (undated) DEVICE — CATHETER,FOLEY,100%SILICONE,16FR,10ML,LF: Brand: MEDLINE

## (undated) DEVICE — SLV SCD KN/LEN ADJ EXPRSS BLENDED MD 1P/U

## (undated) DEVICE — TROCAR: Brand: KII® SLEEVE

## (undated) DEVICE — INTENDED FOR TISSUE SEPARATION, AND OTHER PROCEDURES THAT REQUIRE A SHARP SURGICAL BLADE TO PUNCTURE OR CUT.: Brand: BARD-PARKER ® CARBON RIB-BACK BLADES

## (undated) DEVICE — GLV SURG BIOGEL LTX PF 7 1/2

## (undated) DEVICE — TOWEL,OR,DSP,ST,BLUE,STD,4/PK,20PK/CS: Brand: MEDLINE

## (undated) DEVICE — ADHS LIQ MASTISOL 2/3ML

## (undated) DEVICE — TRY PREP SCRB VAG PVP

## (undated) DEVICE — CATH URETH INTRMIT ALLPURP LTX 16F RED

## (undated) DEVICE — STRIP,CLOSURE,WOUND,MEDI-STRIP,1/2X4: Brand: MEDLINE

## (undated) DEVICE — SYR LUERLOK 50ML

## (undated) DEVICE — SYR LL TP 10ML STRL

## (undated) DEVICE — ENDOPATH XCEL WITH OPTIVIEW TECHNOLOGY BLADELESS TROCARS WITH STABILITY SLEEVES: Brand: ENDOPATH XCEL OPTIVIEW

## (undated) DEVICE — 40418 TRENDELENBURG ONE-STEP ARM PROTECTORS LARGE (1 PAIR): Brand: 40418 TRENDELENBURG ONE-STEP ARM PROTECTORS LARGE (1 PAIR)

## (undated) DEVICE — ENDOPATH PNEUMONEEDLE INSUFFLATION NEEDLES WITH LUER LOCK CONNECTORS 120MM: Brand: ENDOPATH